# Patient Record
Sex: FEMALE | Race: WHITE | NOT HISPANIC OR LATINO | Employment: OTHER | URBAN - METROPOLITAN AREA
[De-identification: names, ages, dates, MRNs, and addresses within clinical notes are randomized per-mention and may not be internally consistent; named-entity substitution may affect disease eponyms.]

---

## 2018-10-30 ENCOUNTER — HOSPITAL ENCOUNTER (EMERGENCY)
Facility: HOSPITAL | Age: 83
Discharge: HOME | End: 2018-10-30
Attending: EMERGENCY MEDICINE | Admitting: EMERGENCY MEDICINE
Payer: MEDICARE

## 2018-10-30 ENCOUNTER — APPOINTMENT (EMERGENCY)
Dept: RADIOLOGY | Facility: HOSPITAL | Age: 83
End: 2018-10-30
Payer: MEDICARE

## 2018-10-30 ENCOUNTER — APPOINTMENT (EMERGENCY)
Dept: CT IMAGING | Facility: HOSPITAL | Age: 83
End: 2018-10-30
Payer: MEDICARE

## 2018-10-30 VITALS
OXYGEN SATURATION: 96 % | HEART RATE: 70 BPM | WEIGHT: 87 LBS | SYSTOLIC BLOOD PRESSURE: 191 MMHG | TEMPERATURE: 97.7 F | RESPIRATION RATE: 18 BRPM | DIASTOLIC BLOOD PRESSURE: 93 MMHG

## 2018-10-30 DIAGNOSIS — S02.2XXA CLOSED FRACTURE OF NASAL BONE, INITIAL ENCOUNTER: Primary | ICD-10-CM

## 2018-10-30 PROCEDURE — 73564 X-RAY EXAM KNEE 4 OR MORE: CPT

## 2018-10-30 PROCEDURE — 70450 CT HEAD/BRAIN W/O DYE: CPT

## 2018-10-30 PROCEDURE — 72125 CT NECK SPINE W/O DYE: CPT

## 2018-10-30 PROCEDURE — 99284 EMERGENCY DEPT VISIT MOD MDM: CPT

## 2018-10-30 PROCEDURE — 70486 CT MAXILLOFACIAL W/O DYE: CPT

## 2018-10-30 RX ORDER — GINSENG 100 MG
2 CAPSULE ORAL ONCE
Status: DISCONTINUED | OUTPATIENT
Start: 2018-10-30 | End: 2018-10-30 | Stop reason: HOSPADM

## 2018-10-30 RX ORDER — GINSENG 100 MG
1 CAPSULE ORAL ONCE
Status: COMPLETED | OUTPATIENT
Start: 2018-10-30 | End: 2018-10-30

## 2018-10-30 RX ADMIN — BACITRACIN 1 SMALL APPLICATION: 500 OINTMENT TOPICAL at 17:44

## 2018-10-30 NOTE — DISCHARGE INSTRUCTIONS
Facial Fracture   WHAT YOU NEED TO KNOW:   A facial fracture is a break in one or more of the bones in your face  The bones in your face include those around your eye, your cheekbones, and the bones of your nose and jaw  A facial fracture may also cause damage to nearby tissue  DISCHARGE INSTRUCTIONS:   Medicines:   · Decongestant medicine:  Decongestants help decrease swelling in your nose and sinuses  This medicine may also help you breathe easier  · Pain medicine: You may be given a prescription medicine to decrease pain  Do not wait until the pain is severe before you take this medicine  · Steroid medicine: This medicine helps decrease swelling in your face  · Antibiotic medicine:  Antibiotic medicine helps treat an infection caused by bacteria  This medicine may be given if you have an open wound  · Take your medicine as directed  Contact your healthcare provider if you think your medicine is not helping or if you have side effects  Tell him of her if you are allergic to any medicine  Keep a list of the medicines, vitamins, and herbs you take  Include the amounts, and when and why you take them  Bring the list or the pill bottles to follow-up visits  Carry your medicine list with you in case of an emergency  Follow up with your healthcare provider as directed:  Write down your questions so you remember to ask them during your visits  Nutrition:  You may not be able to eat solid food for a period of time  You may first be started on a liquid diet  Examples of liquids you may be able to have include, water, broth, gelatin, apple juice, or lemon-lime soda pop  After a few days, you may be allowed to eat soft foods, such as applesauce, bananas, cooked cereal, cottage cheese, pudding, and yogurt  Ask for more information about the type of foods you can eat  Rehabilitation:  If you had surgery to fix your facial fracture, you may need oral and facial rehabilitation   This is done to restore normal use and movement of your facial muscles  Ask for more information about rehabilitation  Help prevent a facial fracture:   · Wear a helmet when you ride a bicycle or a motorcycle  · Wear a seatbelt at all times when you are inside a motor vehicle  · Wear protective headgear and eyewear during sporting activities  Self-care:   · Apply ice:  Ice helps decrease swelling and pain  Ice may also help prevent tissue damage  Use an ice pack or put crushed ice in a plastic bag  Cover it with a towel and place it on your face for 15 to 20 minutes every hour as directed  · Keep your head elevated:  Keep you head above the level of your heart as often as you can  This will help decrease swelling and pain  Prop your head on pillows or blankets to keep it elevated comfortably  · Avoid putting pressure on your face:      ¨ Do not sleep on the injured side of your face  Pressure on the area of your injury may cause further damage  ¨ Sneeze with your mouth open to decrease pressure on your broken facial bones  Too much pressure from a sneeze may cause your broken bones to move and cause more damage  ¨ Try not to blow your nose because it may cause more damage if you have a fracture near your eye  The pressure from blowing your nose may pinch the nerve of your eye and cause permanent damage  · Clean your mouth carefully: It may be hard to clean your teeth if have an injury or fracture near your mouth  Your will be shown the best way to do this so you do not hurt yourself  A water pick or a child-sized soft toothbrush may work well to clean your mouth  Contact your healthcare provider if:   · You are bleeding from a wound on your face  · You have double vision or you suddenly have problems with your eyesight  · You have questions or concerns about your condition or care  Return to the emergency department if:   · You have clear or pinkish fluid draining from your nose or mouth      · You have numbness in your face  · You have worsening pain in your eye or face  · You suddenly have trouble chewing or swallowing  · You suddenly feel lightheaded and short of breath  · You have chest pain when you take a deep breath or cough  You may cough up blood  · Your arm or leg feels warm, tender, and painful  It may look swollen and red  © 2017 [address] [employer] Information is for End User's use only and may not be sold, redistributed or otherwise used for commercial purposes  All illustrations and images included in CareNotes® are the copyrighted property of [employer] , [employer]  or Allen Rodriges  The above information is an  only  It is not intended as medical advice for individual conditions or treatments  Talk to your doctor, nurse or pharmacist before following any medical regimen to see if it is safe and effective for you

## 2018-10-30 NOTE — ED PROVIDER NOTES
History  Chief Complaint   Patient presents with   Dilma Pall Fall     witnessed tripped and fell  hit face and knee on pavement  no LOC and no bloodthinners  49-year-old female presents after fall  She tripped over a curb just prior to arrival   She fell on her right knee and hit her face  Denies loss of consciousness  Not on any blood thinners complaining of facial pain over her right eye and right knee pain  She was able to ambulate after the fall  Denies any lightheadedness or dizziness, chest pain or palpitations before the fall  None       Past Medical History:   Diagnosis Date    Hypertension        History reviewed  No pertinent surgical history  History reviewed  No pertinent family history  I have reviewed and agree with the history as documented  Social History   Substance Use Topics    Smoking status: Never Smoker    Smokeless tobacco: Never Used    Alcohol use Yes      Comment: social        Review of Systems   Constitutional: Negative for chills and fever  HENT: Negative for dental problem and ear pain          +facial pain   Eyes: Negative for pain and redness  Respiratory: Negative for cough and shortness of breath  Cardiovascular: Negative for chest pain and palpitations  Gastrointestinal: Negative for abdominal pain and nausea  Endocrine: Negative for polydipsia and polyphagia  Genitourinary: Negative for dysuria and frequency  Musculoskeletal: Positive for arthralgias  Negative for joint swelling  Skin: Negative for color change and rash  Neurological: Negative for dizziness and headaches  Psychiatric/Behavioral: Negative for behavioral problems and confusion  All other systems reviewed and are negative  Physical Exam  Physical Exam   Constitutional: She is oriented to person, place, and time  She appears well-developed and well-nourished  No distress     HENT:   Right Ear: External ear normal    Left Ear: External ear normal    Nose: Nose normal  Bruising over R side of face worse around right eye with mild TTP   Eyes: Pupils are equal, round, and reactive to light  Conjunctivae and EOM are normal    Neck: Normal range of motion  Neck supple  No JVD present  Cardiovascular: Normal rate, regular rhythm and normal heart sounds  No murmur heard  Pulmonary/Chest: Effort normal and breath sounds normal  No respiratory distress  She has no wheezes  Abdominal: Soft  Bowel sounds are normal  She exhibits no distension  There is no tenderness  Musculoskeletal: Normal range of motion  She exhibits no edema  Skin tear to right knee, full ROM of knee   Neurological: She is alert and oriented to person, place, and time  No cranial nerve deficit  Skin: Skin is warm and dry  Capillary refill takes less than 2 seconds  She is not diaphoretic  Psychiatric: She has a normal mood and affect  Her behavior is normal    Nursing note and vitals reviewed  Vital Signs  ED Triage Vitals [10/30/18 1455]   Temperature Pulse Respirations Blood Pressure SpO2   97 7 °F (36 5 °C) 70 18 (!) 191/93 96 %      Temp Source Heart Rate Source Patient Position - Orthostatic VS BP Location FiO2 (%)   Oral -- -- -- --      Pain Score       --           Vitals:    10/30/18 1455   BP: (!) 191/93   Pulse: 70       Visual Acuity      ED Medications  Medications   bacitracin topical ointment 2 large application (2 large application Topical Not Given 10/30/18 1921)   bacitracin topical ointment 1 small application (1 small application Topical Given 10/30/18 1744)       Diagnostic Studies  Results Reviewed     None                 CT facial bones without contrast   Final Result by Xiang Holder MD (10/30 1826)      Acute left nasal bone slightly displaced fracture  Right infraorbital and supraorbital soft tissue swelling without underlying fracture                 Workstation performed: [id number]         CT spine cervical without contrast   Final Result by Xiang Holder MD (10/30 1823)      No cervical spine fracture or traumatic malalignment  Workstation performed: [id number]         CT head without contrast   Final Result by Pritesh Hamm MD (10/30 1820)      No acute intracranial abnormality  Right infraorbital and supraorbital soft tissue swelling without underlying fracture  Workstation performed: [id number]         XR knee 4+ vw right injury    (Results Pending)              Procedures  Procedures       Phone Contacts  ED Phone Contact    ED Course                               MDM  Number of Diagnoses or Management Options  Closed fracture of nasal bone, initial encounter:   Diagnosis management comments: 61-year-old female presenting after mechanical fall  CT maxillofacial shows left nasal bone fracture  Negative CT head otherwise  Knee pain after fall x-rays negative on my read  Follow up primary care doctor and maxillofacial surgery  CritCare Time    Disposition  Final diagnoses:   Closed fracture of nasal bone, initial encounter     Time reflects when diagnosis was documented in both MDM as applicable and the Disposition within this note     Time User Action Codes Description Comment    10/30/2018  6:44 PM Rosalinda Habermann Add [S02  2XXA] Closed fracture of nasal bone, initial encounter       ED Disposition     ED Disposition Condition Comment    Discharge  Alvaro Joseph discharge to home/self care  Condition at discharge: Good        Follow-up Information     Follow up With Specialties Details Why [address] [facility]  Schedule an appointment as soon as possible for a visit  [facility] [address]  [phone]          There are no discharge medications for this patient  No discharge procedures on file      ED Provider  Electronically Signed by           Farzana Myles MD  10/30/18 2030

## 2023-08-06 ENCOUNTER — APPOINTMENT (EMERGENCY)
Dept: RADIOLOGY | Facility: HOSPITAL | Age: 88
DRG: 481 | End: 2023-08-06
Payer: MEDICARE

## 2023-08-06 ENCOUNTER — HOSPITAL ENCOUNTER (INPATIENT)
Facility: HOSPITAL | Age: 88
LOS: 3 days | Discharge: NURSING, REHAB OR LONG-TERM CARE | DRG: 481 | End: 2023-08-09
Attending: SURGERY | Admitting: SURGERY
Payer: MEDICARE

## 2023-08-06 ENCOUNTER — APPOINTMENT (EMERGENCY)
Dept: CT IMAGING | Facility: HOSPITAL | Age: 88
DRG: 481 | End: 2023-08-06
Payer: MEDICARE

## 2023-08-06 DIAGNOSIS — S72.145A CLOSED NONDISPLACED INTERTROCHANTERIC FRACTURE OF LEFT FEMUR, INITIAL ENCOUNTER (HCC): ICD-10-CM

## 2023-08-06 DIAGNOSIS — S72.90XA FEMUR FRACTURE (HCC): Primary | ICD-10-CM

## 2023-08-06 PROBLEM — G89.11 ACUTE PAIN DUE TO TRAUMA: Status: ACTIVE | Noted: 2023-08-06
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
## 2023-08-06 PROBLEM — S72.92XA FRACTURE OF LEFT FEMUR (HCC): Status: ACTIVE | Noted: 2023-08-06

## 2023-08-06 PROBLEM — I26.99 PULMONARY EMBOLISM (HCC): Status: ACTIVE | Noted: 2023-08-06

## 2023-08-06 PROBLEM — I10 HYPERTENSION: Status: ACTIVE | Noted: 2023-08-06

## 2023-08-06 LAB
ABO GROUP BLD: NORMAL
ANION GAP SERPL CALCULATED.3IONS-SCNC: 6 MMOL/L
ANION GAP SERPL CALCULATED.3IONS-SCNC: 6 MMOL/L
APTT PPP: 27 SECONDS (ref 23–37)
APTT PPP: 27 SECONDS (ref 23–37)
BASE EXCESS BLDA CALC-SCNC: 10 MMOL/L (ref -2–3)
BASE EXCESS BLDA CALC-SCNC: 10 MMOL/L (ref -2–3)
BASOPHILS # BLD AUTO: 0.03 THOUSANDS/ÂΜL (ref 0–0.1)
BASOPHILS # BLD AUTO: 0.03 THOUSANDS/ÂΜL (ref 0–0.1)
BASOPHILS NFR BLD AUTO: 0 % (ref 0–1)
BASOPHILS NFR BLD AUTO: 0 % (ref 0–1)
BLD GP AB SCN SERPL QL: NEGATIVE
BLD GP AB SCN SERPL QL: NEGATIVE
BUN SERPL-MCNC: 24 MG/DL (ref 5–25)
BUN SERPL-MCNC: 24 MG/DL (ref 5–25)
CA-I BLD-SCNC: 1.15 MMOL/L (ref 1.12–1.32)
CA-I BLD-SCNC: 1.15 MMOL/L (ref 1.12–1.32)
CALCIUM SERPL-MCNC: 8.9 MG/DL (ref 8.4–10.2)
CALCIUM SERPL-MCNC: 8.9 MG/DL (ref 8.4–10.2)
CHLORIDE SERPL-SCNC: 102 MMOL/L (ref 96–108)
CHLORIDE SERPL-SCNC: 102 MMOL/L (ref 96–108)
CO2 SERPL-SCNC: 34 MMOL/L (ref 21–32)
CO2 SERPL-SCNC: 34 MMOL/L (ref 21–32)
CREAT SERPL-MCNC: 0.91 MG/DL (ref 0.6–1.3)
CREAT SERPL-MCNC: 0.91 MG/DL (ref 0.6–1.3)
EOSINOPHIL # BLD AUTO: 0.03 THOUSAND/ÂΜL (ref 0–0.61)
EOSINOPHIL # BLD AUTO: 0.03 THOUSAND/ÂΜL (ref 0–0.61)
EOSINOPHIL NFR BLD AUTO: 0 % (ref 0–6)
EOSINOPHIL NFR BLD AUTO: 0 % (ref 0–6)
ERYTHROCYTE [DISTWIDTH] IN BLOOD BY AUTOMATED COUNT: 19.1 % (ref 11.6–15.1)
ERYTHROCYTE [DISTWIDTH] IN BLOOD BY AUTOMATED COUNT: 19.1 % (ref 11.6–15.1)
GFR SERPL CREATININE-BSD FRML MDRD: 50 ML/MIN/1.73SQ M
GFR SERPL CREATININE-BSD FRML MDRD: 50 ML/MIN/1.73SQ M
GLUCOSE SERPL-MCNC: 101 MG/DL (ref 65–140)
GLUCOSE SERPL-MCNC: 101 MG/DL (ref 65–140)
GLUCOSE SERPL-MCNC: 175 MG/DL (ref 65–140)
GLUCOSE SERPL-MCNC: 175 MG/DL (ref 65–140)
HCO3 BLDA-SCNC: 35.5 MMOL/L (ref 24–30)
HCO3 BLDA-SCNC: 35.5 MMOL/L (ref 24–30)
HCT VFR BLD AUTO: 34.5 % (ref 34.8–46.1)
HCT VFR BLD AUTO: 34.5 % (ref 34.8–46.1)
HCT VFR BLD CALC: 34 % (ref 34.8–46.1)
HCT VFR BLD CALC: 34 % (ref 34.8–46.1)
HGB BLD-MCNC: 10.3 G/DL (ref 11.5–15.4)
HGB BLD-MCNC: 10.3 G/DL (ref 11.5–15.4)
HGB BLDA-MCNC: 11.6 G/DL (ref 11.5–15.4)
HGB BLDA-MCNC: 11.6 G/DL (ref 11.5–15.4)
HOLD SPECIMEN: NORMAL
IMM GRANULOCYTES # BLD AUTO: 0.05 THOUSAND/UL (ref 0–0.2)
IMM GRANULOCYTES # BLD AUTO: 0.05 THOUSAND/UL (ref 0–0.2)
IMM GRANULOCYTES NFR BLD AUTO: 0 % (ref 0–2)
IMM GRANULOCYTES NFR BLD AUTO: 0 % (ref 0–2)
INR PPP: 1.11 (ref 0.84–1.19)
INR PPP: 1.11 (ref 0.84–1.19)
LYMPHOCYTES # BLD AUTO: 1.17 THOUSANDS/ÂΜL (ref 0.6–4.47)
LYMPHOCYTES # BLD AUTO: 1.17 THOUSANDS/ÂΜL (ref 0.6–4.47)
LYMPHOCYTES NFR BLD AUTO: 10 % (ref 14–44)
LYMPHOCYTES NFR BLD AUTO: 10 % (ref 14–44)
MAGNESIUM SERPL-MCNC: 2.1 MG/DL (ref 1.9–2.7)
MAGNESIUM SERPL-MCNC: 2.1 MG/DL (ref 1.9–2.7)
MCH RBC QN AUTO: 29.3 PG (ref 26.8–34.3)
MCH RBC QN AUTO: 29.3 PG (ref 26.8–34.3)
MCHC RBC AUTO-ENTMCNC: 29.9 G/DL (ref 31.4–37.4)
MCHC RBC AUTO-ENTMCNC: 29.9 G/DL (ref 31.4–37.4)
MCV RBC AUTO: 98 FL (ref 82–98)
MCV RBC AUTO: 98 FL (ref 82–98)
MONOCYTES # BLD AUTO: 0.51 THOUSAND/ÂΜL (ref 0.17–1.22)
MONOCYTES # BLD AUTO: 0.51 THOUSAND/ÂΜL (ref 0.17–1.22)
MONOCYTES NFR BLD AUTO: 4 % (ref 4–12)
MONOCYTES NFR BLD AUTO: 4 % (ref 4–12)
NEUTROPHILS # BLD AUTO: 10.41 THOUSANDS/ÂΜL (ref 1.85–7.62)
NEUTROPHILS # BLD AUTO: 10.41 THOUSANDS/ÂΜL (ref 1.85–7.62)
NEUTS SEG NFR BLD AUTO: 86 % (ref 43–75)
NEUTS SEG NFR BLD AUTO: 86 % (ref 43–75)
NRBC BLD AUTO-RTO: 0 /100 WBCS
NRBC BLD AUTO-RTO: 0 /100 WBCS
PCO2 BLD: 37 MMOL/L (ref 21–32)
PCO2 BLD: 37 MMOL/L (ref 21–32)
PCO2 BLD: 50.8 MM HG (ref 42–50)
PCO2 BLD: 50.8 MM HG (ref 42–50)
PH BLD: 7.45 [PH] (ref 7.3–7.4)
PH BLD: 7.45 [PH] (ref 7.3–7.4)
PHOSPHATE SERPL-MCNC: 3.5 MG/DL (ref 2.3–4.1)
PHOSPHATE SERPL-MCNC: 3.5 MG/DL (ref 2.3–4.1)
PLATELET # BLD AUTO: 297 THOUSANDS/UL (ref 149–390)
PLATELET # BLD AUTO: 297 THOUSANDS/UL (ref 149–390)
PLATELET # BLD AUTO: 304 THOUSANDS/UL (ref 149–390)
PLATELET # BLD AUTO: 304 THOUSANDS/UL (ref 149–390)
PMV BLD AUTO: 10.8 FL (ref 8.9–12.7)
PMV BLD AUTO: 10.8 FL (ref 8.9–12.7)
PMV BLD AUTO: 11.9 FL (ref 8.9–12.7)
PMV BLD AUTO: 11.9 FL (ref 8.9–12.7)
PO2 BLD: 35 MM HG (ref 35–45)
PO2 BLD: 35 MM HG (ref 35–45)
POTASSIUM BLD-SCNC: 4.4 MMOL/L (ref 3.5–5.3)
POTASSIUM BLD-SCNC: 4.4 MMOL/L (ref 3.5–5.3)
POTASSIUM SERPL-SCNC: 4 MMOL/L (ref 3.5–5.3)
POTASSIUM SERPL-SCNC: 4 MMOL/L (ref 3.5–5.3)
PROTHROMBIN TIME: 14.6 SECONDS (ref 11.6–14.5)
PROTHROMBIN TIME: 14.6 SECONDS (ref 11.6–14.5)
RBC # BLD AUTO: 3.52 MILLION/UL (ref 3.81–5.12)
RBC # BLD AUTO: 3.52 MILLION/UL (ref 3.81–5.12)
RH BLD: POSITIVE
SAO2 % BLD FROM PO2: 68 % (ref 60–85)
SAO2 % BLD FROM PO2: 68 % (ref 60–85)
SODIUM BLD-SCNC: 142 MMOL/L (ref 136–145)
SODIUM BLD-SCNC: 142 MMOL/L (ref 136–145)
SODIUM SERPL-SCNC: 142 MMOL/L (ref 135–147)
SODIUM SERPL-SCNC: 142 MMOL/L (ref 135–147)
SPECIMEN EXPIRATION DATE: NORMAL
SPECIMEN EXPIRATION DATE: NORMAL
SPECIMEN SOURCE: ABNORMAL
SPECIMEN SOURCE: ABNORMAL
WBC # BLD AUTO: 12.2 THOUSAND/UL (ref 4.31–10.16)
WBC # BLD AUTO: 12.2 THOUSAND/UL (ref 4.31–10.16)

## 2023-08-06 PROCEDURE — 93005 ELECTROCARDIOGRAM TRACING: CPT

## 2023-08-06 PROCEDURE — 86850 RBC ANTIBODY SCREEN: CPT | Performed by: SURGERY

## 2023-08-06 PROCEDURE — 84132 ASSAY OF SERUM POTASSIUM: CPT

## 2023-08-06 PROCEDURE — 85025 COMPLETE CBC W/AUTO DIFF WBC: CPT | Performed by: PHYSICIAN ASSISTANT

## 2023-08-06 PROCEDURE — 82947 ASSAY GLUCOSE BLOOD QUANT: CPT

## 2023-08-06 PROCEDURE — 30233N1 TRANSFUSION OF NONAUTOLOGOUS RED BLOOD CELLS INTO PERIPHERAL VEIN, PERCUTANEOUS APPROACH: ICD-10-PCS | Performed by: SURGERY

## 2023-08-06 PROCEDURE — EDAIR PR ED AIR: Performed by: EMERGENCY MEDICINE

## 2023-08-06 PROCEDURE — 82803 BLOOD GASES ANY COMBINATION: CPT

## 2023-08-06 PROCEDURE — 99223 1ST HOSP IP/OBS HIGH 75: CPT | Performed by: SURGERY

## 2023-08-06 PROCEDURE — 70450 CT HEAD/BRAIN W/O DYE: CPT

## 2023-08-06 PROCEDURE — 85014 HEMATOCRIT: CPT

## 2023-08-06 PROCEDURE — 99223 1ST HOSP IP/OBS HIGH 75: CPT | Performed by: STUDENT IN AN ORGANIZED HEALTH CARE EDUCATION/TRAINING PROGRAM

## 2023-08-06 PROCEDURE — 85049 AUTOMATED PLATELET COUNT: CPT

## 2023-08-06 PROCEDURE — 93308 TTE F-UP OR LMTD: CPT | Performed by: PHYSICIAN ASSISTANT

## 2023-08-06 PROCEDURE — 84295 ASSAY OF SERUM SODIUM: CPT

## 2023-08-06 PROCEDURE — 86920 COMPATIBILITY TEST SPIN: CPT

## 2023-08-06 PROCEDURE — 76705 ECHO EXAM OF ABDOMEN: CPT | Performed by: PHYSICIAN ASSISTANT

## 2023-08-06 PROCEDURE — 85610 PROTHROMBIN TIME: CPT | Performed by: PHYSICIAN ASSISTANT

## 2023-08-06 PROCEDURE — 85730 THROMBOPLASTIN TIME PARTIAL: CPT | Performed by: PHYSICIAN ASSISTANT

## 2023-08-06 PROCEDURE — 82330 ASSAY OF CALCIUM: CPT

## 2023-08-06 PROCEDURE — 72125 CT NECK SPINE W/O DYE: CPT

## 2023-08-06 PROCEDURE — 99285 EMERGENCY DEPT VISIT HI MDM: CPT

## 2023-08-06 PROCEDURE — 86900 BLOOD TYPING SEROLOGIC ABO: CPT | Performed by: SURGERY

## 2023-08-06 PROCEDURE — 84100 ASSAY OF PHOSPHORUS: CPT | Performed by: PHYSICIAN ASSISTANT

## 2023-08-06 PROCEDURE — 86901 BLOOD TYPING SEROLOGIC RH(D): CPT | Performed by: SURGERY

## 2023-08-06 PROCEDURE — 36415 COLL VENOUS BLD VENIPUNCTURE: CPT | Performed by: PHYSICIAN ASSISTANT

## 2023-08-06 PROCEDURE — 73552 X-RAY EXAM OF FEMUR 2/>: CPT

## 2023-08-06 PROCEDURE — 80048 BASIC METABOLIC PNL TOTAL CA: CPT | Performed by: PHYSICIAN ASSISTANT

## 2023-08-06 PROCEDURE — 83735 ASSAY OF MAGNESIUM: CPT | Performed by: PHYSICIAN ASSISTANT

## 2023-08-06 RX ORDER — MELATONIN
2000 DAILY
Status: DISCONTINUED | OUTPATIENT
Start: 2023-08-07 | End: 2023-08-09 | Stop reason: HOSPADM

## 2023-08-06 RX ORDER — ASPIRIN 81 MG/1
81 TABLET, CHEWABLE ORAL DAILY
COMMUNITY
End: 2023-08-14

## 2023-08-06 RX ORDER — FOLIC ACID 1 MG/1
1 TABLET ORAL DAILY
COMMUNITY
End: 2023-08-14

## 2023-08-06 RX ORDER — ONDANSETRON 2 MG/ML
4 INJECTION INTRAMUSCULAR; INTRAVENOUS EVERY 6 HOURS PRN
Status: DISCONTINUED | OUTPATIENT
Start: 2023-08-06 | End: 2023-08-06

## 2023-08-06 RX ORDER — HEPARIN SODIUM 10000 [USP'U]/100ML
3-30 INJECTION, SOLUTION INTRAVENOUS
Status: DISCONTINUED | OUTPATIENT
Start: 2023-08-06 | End: 2023-08-07

## 2023-08-06 RX ORDER — ACETAMINOPHEN 325 MG/1
975 TABLET ORAL EVERY 8 HOURS SCHEDULED
Status: DISCONTINUED | OUTPATIENT
Start: 2023-08-06 | End: 2023-08-09 | Stop reason: HOSPADM

## 2023-08-06 RX ORDER — AMLODIPINE BESYLATE 10 MG/1
5 TABLET ORAL DAILY
COMMUNITY
End: 2023-08-14

## 2023-08-06 RX ORDER — ISOSORBIDE MONONITRATE 30 MG/1
30 TABLET, EXTENDED RELEASE ORAL DAILY
COMMUNITY

## 2023-08-06 RX ORDER — ONDANSETRON 2 MG/ML
4 INJECTION INTRAMUSCULAR; INTRAVENOUS EVERY 4 HOURS PRN
Status: DISCONTINUED | OUTPATIENT
Start: 2023-08-06 | End: 2023-08-09 | Stop reason: HOSPADM

## 2023-08-06 RX ORDER — HEPARIN SODIUM 1000 [USP'U]/ML
1600 INJECTION, SOLUTION INTRAVENOUS; SUBCUTANEOUS EVERY 6 HOURS PRN
Status: DISCONTINUED | OUTPATIENT
Start: 2023-08-06 | End: 2023-08-07

## 2023-08-06 RX ORDER — OXYCODONE HYDROCHLORIDE 5 MG/1
5 TABLET ORAL EVERY 4 HOURS PRN
Status: DISCONTINUED | OUTPATIENT
Start: 2023-08-06 | End: 2023-08-09 | Stop reason: HOSPADM

## 2023-08-06 RX ORDER — HEPARIN SODIUM 1000 [USP'U]/ML
3200 INJECTION, SOLUTION INTRAVENOUS; SUBCUTANEOUS EVERY 6 HOURS PRN
Status: DISCONTINUED | OUTPATIENT
Start: 2023-08-06 | End: 2023-08-07

## 2023-08-06 RX ORDER — ENOXAPARIN SODIUM 100 MG/ML
30 INJECTION SUBCUTANEOUS
Status: DISCONTINUED | OUTPATIENT
Start: 2023-08-06 | End: 2023-08-06

## 2023-08-06 RX ORDER — MELATONIN
2000 DAILY
COMMUNITY
End: 2023-08-14

## 2023-08-06 RX ORDER — LISINOPRIL 10 MG/1
10 TABLET ORAL 2 TIMES DAILY
COMMUNITY
End: 2023-08-14

## 2023-08-06 RX ORDER — HYDROMORPHONE HCL IN WATER/PF 6 MG/30 ML
0.2 PATIENT CONTROLLED ANALGESIA SYRINGE INTRAVENOUS EVERY 4 HOURS PRN
Status: DISCONTINUED | OUTPATIENT
Start: 2023-08-06 | End: 2023-08-09 | Stop reason: HOSPADM

## 2023-08-06 RX ORDER — ISOSORBIDE MONONITRATE 30 MG/1
30 TABLET, EXTENDED RELEASE ORAL DAILY
Status: DISCONTINUED | OUTPATIENT
Start: 2023-08-07 | End: 2023-08-09 | Stop reason: HOSPADM

## 2023-08-06 RX ORDER — METOPROLOL SUCCINATE 25 MG/1
25 TABLET, EXTENDED RELEASE ORAL
COMMUNITY
End: 2023-08-14

## 2023-08-06 RX ORDER — AMLODIPINE BESYLATE 5 MG/1
5 TABLET ORAL DAILY
Status: DISCONTINUED | OUTPATIENT
Start: 2023-08-07 | End: 2023-08-09 | Stop reason: HOSPADM

## 2023-08-06 RX ORDER — GABAPENTIN 100 MG/1
100 CAPSULE ORAL
Status: DISCONTINUED | OUTPATIENT
Start: 2023-08-06 | End: 2023-08-09 | Stop reason: HOSPADM

## 2023-08-06 RX ORDER — FOLIC ACID 1 MG/1
1 TABLET ORAL DAILY
Status: DISCONTINUED | OUTPATIENT
Start: 2023-08-07 | End: 2023-08-09 | Stop reason: HOSPADM

## 2023-08-06 RX ORDER — SODIUM CHLORIDE, SODIUM GLUCONATE, SODIUM ACETATE, POTASSIUM CHLORIDE, MAGNESIUM CHLORIDE, SODIUM PHOSPHATE, DIBASIC, AND POTASSIUM PHOSPHATE .53; .5; .37; .037; .03; .012; .00082 G/100ML; G/100ML; G/100ML; G/100ML; G/100ML; G/100ML; G/100ML
50 INJECTION, SOLUTION INTRAVENOUS CONTINUOUS
Status: DISCONTINUED | OUTPATIENT
Start: 2023-08-06 | End: 2023-08-06

## 2023-08-06 RX ORDER — POLYETHYLENE GLYCOL 3350 17 G/17G
17 POWDER, FOR SOLUTION ORAL DAILY
Status: DISCONTINUED | OUTPATIENT
Start: 2023-08-06 | End: 2023-08-09 | Stop reason: HOSPADM

## 2023-08-06 RX ORDER — METOPROLOL SUCCINATE 25 MG/1
25 TABLET, EXTENDED RELEASE ORAL
Status: DISCONTINUED | OUTPATIENT
Start: 2023-08-06 | End: 2023-08-09 | Stop reason: HOSPADM

## 2023-08-06 RX ADMIN — TRANEXAMIC ACID 430 MG: 100 INJECTION, SOLUTION INTRAVENOUS at 16:23

## 2023-08-06 RX ADMIN — HEPARIN SODIUM 18 UNITS/KG/HR: 10000 INJECTION, SOLUTION INTRAVENOUS at 18:09

## 2023-08-06 RX ADMIN — GABAPENTIN 100 MG: 100 CAPSULE ORAL at 21:07

## 2023-08-06 RX ADMIN — ACETAMINOPHEN 975 MG: 325 TABLET, FILM COATED ORAL at 15:40

## 2023-08-06 RX ADMIN — ACETAMINOPHEN 975 MG: 325 TABLET, FILM COATED ORAL at 21:07

## 2023-08-06 RX ADMIN — OXYCODONE HYDROCHLORIDE 5 MG: 5 TABLET ORAL at 17:37

## 2023-08-06 RX ADMIN — METOPROLOL SUCCINATE 25 MG: 25 TABLET, EXTENDED RELEASE ORAL at 21:06

## 2023-08-06 RX ADMIN — SODIUM CHLORIDE, SODIUM GLUCONATE, SODIUM ACETATE, POTASSIUM CHLORIDE, MAGNESIUM CHLORIDE, SODIUM PHOSPHATE, DIBASIC, AND POTASSIUM PHOSPHATE 50 ML/HR: .53; .5; .37; .037; .03; .012; .00082 INJECTION, SOLUTION INTRAVENOUS at 11:54

## 2023-08-06 RX ADMIN — ENOXAPARIN SODIUM 30 MG: 30 INJECTION SUBCUTANEOUS at 17:37

## 2023-08-06 NOTE — PROCEDURES
POC FAST US    Date/Time: 8/6/2023 10:34 AM    Performed by: Deepa Hall PA-C  Authorized by: Deepa Hall PA-C    Patient location:  Trauma  Procedure details:     Exam Type:  Diagnostic    Indications: blunt abdominal trauma and blunt chest trauma      Assess for:  Intra-abdominal fluid and pericardial effusion    Technique: FAST      Views obtained:  Heart - Pericardial sac, LUQ - Splenorenal space, Suprapubic - Pouch of James and RUQ - Galvan's Pouch    Image quality: diagnostic      Image availability:  Images available in PACS and video obtained  FAST Findings:     RUQ (Hepatorenal) free fluid: absent      LUQ (Splenorenal) free fluid: absent      Suprapubic free fluid: absent      Cardiac wall motion: identified      Pericardial effusion: absent    Interpretation:     Impressions: negative

## 2023-08-06 NOTE — ASSESSMENT & PLAN NOTE
- history of HTN  - continue home Toprol XL and amlodipine. Will hold lisinopril in the perioperative setting.

## 2023-08-06 NOTE — ASSESSMENT & PLAN NOTE
- per patient's POA, she was diagnosed with acute PE within the last couple of months at [facility]. She has been on Eliquis and 4 L oxygen NC since that time. - she follows now with her PCP for this  - heparin infusion while off of Eliquis.  Will hold pre op.   - Hold Eliquis for hip surgery for now  - continue supplemental oxygen

## 2023-08-06 NOTE — ASSESSMENT & PLAN NOTE
- left femoral fracture, present on admission.  - Orthopedic surgery evaluation pending.   - TXA given  - Maintain non-weightbearing status on the left extremity.   - Monitor left lower extremity neurovascular exam.  - Continue multimodal analgesic regimen.  - Continue DVT prophylaxis. Hold Eliquis. - PT and OT evaluation and treatment as indicated. - Outpatient follow up with Orthopedic surgery for re-evaluation.

## 2023-08-06 NOTE — H&P
[facility]  H&P  Name: Pietro Valdez 80 y.o. female I MRN: [id number]  Unit/Bed#: S -01 I Date of Admission: 8/6/2023   Date of Service: 8/6/2023 I Hospital Day: 0      Assessment/Plan   Fall  Assessment & Plan  - mechanical fall   - sustained below stated injuries  - PT/OT evaluations  - Geriatrics evaluation pending  - CM for dispo planning    * Fracture of left femur ([address])  Assessment & Plan  - left femoral fracture, present on admission.  - Orthopedic surgery evaluation pending.   - TXA given  - Maintain non-weightbearing status on the left extremity.   - Monitor left lower extremity neurovascular exam.  - Continue multimodal analgesic regimen.  - Continue DVT prophylaxis. Hold Eliquis. - PT and OT evaluation and treatment as indicated. - Outpatient follow up with Orthopedic surgery for re-evaluation. Acute pain due to trauma  Assessment & Plan  - Acute pain secondary to traumatic injuries. - Appreciate APS evaluation and recommendations. - Bowel regimen as long as using opioids.  - Continue to monitor pain and adjust regimen as indicated. Hypertension  Assessment & Plan  - history of HTN  - continue home Toprol XL and amlodipine. Will hold lisinopril in the perioperative setting. Pulmonary embolism [facility])  Assessment & Plan  - per patient's POA, she was diagnosed with acute PE within the last couple of months at [facility]. She has been on Eliquis and 4 L oxygen NC since that time. - she follows now with her PCP for this  - heparin infusion while off of Eliquis. Will hold pre op.   - Hold Eliquis for hip surgery for now  - continue supplemental oxygen        Trauma Alert: Level B   Model of Arrival: Ambulance    Trauma Team: Attending Yogesh Patricio and AP Frutiger  Consultants:     Orthopedics: routine consult; Epic consult order placed; History of Present Illness     Chief Complaint: "My hip hurts"  Mechanism:Fall     HPI:    Pietro Valdez is a 80 y. o. female who presents s/p mechanical fall at home in her kitchen when she was trying to turn on the light. She tripped and fell, bumping her head on the trash can. She fell on her left hip and has left hip pain. She denies head pain or neck pain. 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 She lives alone and has a caregiver who was with her when she fell. No LOC. Review of Systems   Constitutional: Negative. HENT: Negative. Respiratory: Negative. Negative for chest tightness and shortness of breath. Cardiovascular: Negative. Negative for chest pain. Gastrointestinal: Negative. Negative for abdominal pain. Genitourinary: Negative. Musculoskeletal:        + left hip pain   Skin: Negative. Neurological: Negative. Negative for dizziness, weakness, light-headedness, numbness and headaches. Hematological: Negative. Psychiatric/Behavioral: Negative. 12-point, complete review of systems was reviewed and negative except as stated above. Historical Information     Past Medical History:   Diagnosis Date   • CAD (coronary artery disease)    • Hypertension    • Pulmonary embolism ([address])      History reviewed. No pertinent surgical history. Unable to obtain history due to Unreliable historian     Social History     Tobacco Use   • Smoking status: Never   • Smokeless tobacco: Never   Substance Use Topics   • Alcohol use: Yes     Alcohol/week: 2.0 standard drinks of alcohol     Types: 2 Glasses of wine per week   • Drug use: Never     Last Tetanus: unknown  Family History: Non-contributory    1. Before the illness or injury that brought you to the Emergency, did you need someone to help you on a regular basis? 1=Yes   2. Since the illness or injury that brought you to the Emergency, have you needed more help than usual to take care of yourself? 1=Yes   3. Have you been hospitalized for one or more nights during the past 6 months (excluding a stay in the Emergency Department)? 1=Yes   4.  In general, do you see well? 0=Yes   5. In general, do you have serious problems with your memory? 1=Yes   6. Do you take more than three different medications everyday? 1=Yes   TOTAL   6     Did you order a geriatric consult if the score was 2 or greater?: yes     Meds/Allergies   all current active meds have been reviewed and PTA meds:   Prior to Admission Medications   Prescriptions Last Dose Informant Patient Reported? Taking? amLODIPine (NORVASC) 10 mg tablet   Yes No   Sig: Take 5 mg by mouth daily   apixaban (Eliquis) 5 mg   Yes No   Sig: Take 5 mg by mouth 2 (two) times a day   aspirin 81 mg chewable tablet   Yes No   Sig: Chew 81 mg daily   cholecalciferol (VITAMIN D3) 1,000 units tablet   Yes No   Sig: Take 2,000 Units by mouth daily   folic acid (FOLVITE) 1 mg tablet   Yes No   Sig: Take 1 mg by mouth daily   isosorbide mononitrate (IMDUR) 30 mg 24 hr tablet   Yes No   Sig: Take 30 mg by mouth daily   lisinopril (ZESTRIL) 10 mg tablet   Yes No   Sig: Take 10 mg by mouth 2 (two) times a day   metoprolol succinate (TOPROL-XL) 25 mg 24 hr tablet   Yes No   Sig: Take 25 mg by mouth daily at bedtime      Facility-Administered Medications: None     Allergies have not been reviewed;   No Known Allergies    Objective   Initial Vitals:   Temperature: 97.9 °F (36.6 °C) (08/06/23 1020)  Pulse: 69 (08/06/23 1020)  Respirations: 16 (08/06/23 1020)  Blood Pressure: (!) 179/84 (08/06/23 1020)    Primary Survey:   Airway:        Status: patent;        Pre-hospital Interventions: none        Hospital Interventions: none  Breathing:        Pre-hospital Interventions: none       Effort: normal       Right breath sounds: normal       Left breath sounds: normal  Circulation:        Rhythm: regular       Rate: regular   Right Pulses Left Pulses    R radial: 2+  R femoral: 2+  R pedal: 2+  R carotid: 2+  R popliteal: 2+ L radial: 2+  L femoral: 2+  L pedal: 2+  L carotid: 2+  L popliteal: 2+   Disability:        GCS: Eye: 4; Verbal: 5 Motor: 6 Total: 15       Right Pupil: round;  reactive         Left Pupil:  round;  reactive      R Motor Strength L Motor Strength    R : 5/5  R dorsiflex: 5/5  R plantarflex: 5/5 L : 5/5  L dorsiflex: 5/5  L plantarflex: 5/5        Sensory:  No sensory deficit  Exposure:       Completed: Yes      Secondary Survey:  Physical Exam  HENT:      Head: Normocephalic. Nose: Nose normal.      Mouth/Throat:      Mouth: Mucous membranes are moist.   Eyes:      Pupils: Pupils are equal, round, and reactive to light. Cardiovascular:      Rate and Rhythm: Normal rate and regular rhythm. Pulses: Normal pulses. Pulmonary:      Effort: Pulmonary effort is normal. No respiratory distress. Breath sounds: Normal breath sounds. Abdominal:      General: Abdomen is flat. Palpations: Abdomen is soft. Tenderness: There is no abdominal tenderness. There is no guarding. Genitourinary:     General: Normal vulva. Musculoskeletal:      Cervical back: Normal range of motion and neck supple. No tenderness. Comments: + Left hip tenderness and unable to lift the leg    Skin:     General: Skin is warm and dry. Capillary Refill: Capillary refill takes less than 2 seconds. Neurological:      General: No focal deficit present. Mental Status: She is alert and oriented to person, place, and time. Sensory: No sensory deficit. Motor: No weakness. Psychiatric:         Behavior: Behavior normal.         Invasive Devices     Peripheral Intravenous Line  Duration           Peripheral IV 08/06/23 Left;Upper;Ventral (anterior) Arm <1 day              Lab Results: I have personally reviewed all pertinent laboratory/test results 08/06/23 and in the preceding 24 hours.   Recent Labs     08/06/23  1028 08/06/23  1029 08/06/23  1510   WBC  --   --  12.20*   HGB 11.6  --  10.3*   HCT 34*  --  34.5*   PLT  --    < > 297   SODIUM  --   --  142   K  --   --  4.0   CL  --   --  102   CO2 37*  --  34* BUN  --   --  24   CREATININE  --   --  0.91   GLUC  --   --  175*   CAIONIZED 1.15  --   --    MG  --   --  2.1   PHOS  --   --  3.5    < > = values in this interval not displayed. Imaging Results: I have personally reviewed pertinent images saved in PACS. CT scan findings (and other pertinent positive findings on images) were discussed with radiology. My interpretation of the images/reports are as follows:  XR Trauma multiple (SLB/SLRA trauma bay ONLY)    Result Date: 8/6/2023  Impression: Acute, mildly displaced intertrochanteric fracture of left femur. As per review of electronic medical record, trauma team aware of this finding at time of this dictation. Severely limited evaluation of the thorax due to patient rotation. No definite pneumothorax. No obvious displaced thoracic fractures within limitation of supine AP chest technique. Workstation performed: [id number]     TRAUMA - CT spine cervical wo contrast    Result Date: 8/6/2023  Impression: No cervical spine fracture or traumatic malalignment. Moderate spondylosis. Workstation performed: [id number]     TRAUMA - CT head wo contrast    Result Date: 8/6/2023  Impression: No intracranial hemorrhage or calvarial fracture. Moderate microangiopathy Workstation performed: [id number]     Other Studies: no new    Code Status: Level 1 - Full Code  Advance Directive and Living Will:      Power of :    POLST:    I have spent 40 minutes with Patient and family today in which greater than 50% of this time was spent in counseling/coordination of care regarding Diagnostic results, Prognosis, Impressions, Counseling / Coordination of care, Documenting in the medical record, Reviewing / ordering tests, medicine, procedures   and Obtaining or reviewing history  .

## 2023-08-06 NOTE — ASSESSMENT & PLAN NOTE
- Acute pain secondary to traumatic injuries. - Appreciate APS evaluation and recommendations. - Bowel regimen as long as using opioids.  - Continue to monitor pain and adjust regimen as indicated.

## 2023-08-06 NOTE — ED PROVIDER NOTES
Emergency Department Airway Evaluation and Management Form    History  Obtained from: EMS  Patient has no allergy information on record. No chief complaint on file. HPI    79 y/o fall, hit head, reported to be on anticoagulation    Airway intact. Rest of eval and treatment by trauma team    No past medical history on file. No past surgical history on file. No family history on file. I have reviewed and agree with the history as documented.     Review of Systems    Physical Exam  Resp 16     Physical Exam    ED Medications  Medications - No data to display    Intubation  Procedures    Notes      Final Diagnosis  Final diagnoses:   None       ED Provider  Electronically Signed by     Ginny Lacy MD  08/06/23 1900

## 2023-08-06 NOTE — CONSULTS
Orthopedics   [facility] 80 y.o. female MRN: [id number]  Unit/Bed#: S -01      Chief Complaint:   left hip pain    HPI:   80 y.o. female community ambulator status post fall at home complaining of left hip pain and inability to bear weight. Patient lives alone with her dog. She denies any significant medical issues. Denies chest pain or SOB, GI issues. Review Of Systems:   · Skin: Normal  · Neuro: See HPI  · Musculoskeletal: See HPI  · 14 point review of systems negative except as stated above     Past Medical History:   No past medical history on file. Past Surgical History:   No past surgical history on file. Family History:  Family history reviewed and non-contributory  No family history on file. Social History:  Social History     Socioeconomic History   • Marital status:       Spouse name: Not on file   • Number of children: Not on file   • Years of education: Not on file   • Highest education level: Not on file   Occupational History   • Not on file   Tobacco Use   • Smoking status: Not on file   • Smokeless tobacco: Not on file   Substance and Sexual Activity   • Alcohol use: Not on file   • Drug use: Not on file   • Sexual activity: Not on file   Other Topics Concern   • Not on file   Social History Narrative   • Not on file     Social Determinants of Health     Financial Resource Strain: Not on file   Food Insecurity: Not on file   Transportation Needs: Not on file   Physical Activity: Not on file   Stress: Not on file   Social Connections: Not on file   Intimate Partner Violence: Not on file   Housing Stability: Not on file       Allergies:   Not on File        Labs:  0   Lab Value Date/Time    HCT 34 (L) 08/06/2023 1028    HGB 11.6 08/06/2023 1028       Meds:    Current Facility-Administered Medications:   •  acetaminophen (TYLENOL) tablet 975 mg, 975 mg, Oral, Q8H [address], Ivy Wisdom PA-C  •  enoxaparin (LOVENOX) subcutaneous injection 30 mg, 30 mg, Subcutaneous, Q24H [address], Ivy Ha PA-C  •  gabapentin (NEURONTIN) capsule 100 mg, 100 mg, Oral, HS, Ivy Wisdom PA-C  •  HYDROmorphone HCl (DILAUDID) injection 0.2 mg, 0.2 mg, Intravenous, Q4H PRN, Topher Marie PA-C  •  multi-electrolyte (PLASMALYTE-A/ISOLYTE-S PH 7.4) IV solution, 50 mL/hr, Intravenous, Continuous, Ivy Wisdom PA-C, Last Rate: 50 mL/hr at 08/06/23 1154, 50 mL/hr at 08/06/23 1154  •  ondansetron (ZOFRAN) injection 4 mg, 4 mg, Intravenous, Q4H PRN, Topher Marie PA-C  •  oxyCODONE (ROXICODONE) IR tablet 5 mg, 5 mg, Oral, Q4H PRN, Topher Marie PA-C  •  oxyCODONE (ROXICODONE) split tablet 2.5 mg, 2.5 mg, Oral, Q4H PRN, Topher Marie PA-C  •  polyethylene glycol (MIRALAX) packet 17 g, 17 g, Oral, Daily, Ivy Wisdom PA-C  •  tranexamic Acid 430 mg in sodium chloride 0.9 % 100 mL IVPB, 10 mg/kg, Intravenous, Once, Topher Marie PA-C    Blood Culture:   No results found for: "BLOODCX"    Wound Culture:   No results found for: "WOUNDCULT"    Ins and Outs:  No intake/output data recorded. Physical Exam:   /93   Pulse 71   Temp 98.3 °F (36.8 °C)   Resp 16   Wt 43 kg (94 lb 12.8 oz)   SpO2 94%   Gen: No acute distress, resting comfortably in bed  HEENT: Eyes clear, moist mucus membranes, hearing intact  Respiratory: No audible wheezing or stridor  Cardiovascular: Well Perfused peripherally, 2+ distal pulse  Abdomen: nondistended, no peritoneal signs  Musculoskeletal: left lower extremity  · Skin intact, limb shortened and externally rotated  · Tender to palpation over hip  · ROM not assessed 2/2 known fracture  · Sensation intact L3-S1  · Intact ankle dorsi/plantar flexion, EHL/FHL  · 2+ DP/ PT pulse  · Musculature is soft and compressible, no pain with passive stretch    Radiology:   I personally reviewed the films.   X-rays AP/Lateral views of left hip shows Intertrochanteric femur fracture    _*_*_*_*_*_*_*_*_*_*_*_*_*_*_*_*_*_*_*_*_*_*_*_*_*_*_*_*_*_*_*_*_*_*_*_*_*_*_*_*_*    Assessment:  89 y. o.female status post mechanical fall with leftIntertrochanteric femur fracture    Plan:   · Non weight bearing left lower extremity  · Analgesics for pain  · NPO at midnight  · Sanchez Catheter insertion  · Medicine consult for all medical management and preoperative risk stratification  · Patient has history of PE within the last year and is on Eliquis  · Patient has history of HTN  · History of BCC  · To OR for IM nail femur fracture of Intertrochanteric femur fracture  · Dispo: Ortho will follow    Gay Tucker PA-C

## 2023-08-06 NOTE — ASSESSMENT & PLAN NOTE
- mechanical fall   - sustained below stated injuries  - PT/OT evaluations  - Geriatrics evaluation pending  - CM for dispo planning

## 2023-08-07 ENCOUNTER — APPOINTMENT (INPATIENT)
Dept: RADIOLOGY | Facility: HOSPITAL | Age: 88
DRG: 481 | End: 2023-08-07
Payer: MEDICARE

## 2023-08-07 ENCOUNTER — ANESTHESIA (INPATIENT)
Dept: PERIOP | Facility: HOSPITAL | Age: 88
DRG: 481 | End: 2023-08-07
Payer: MEDICARE

## 2023-08-07 ENCOUNTER — ANESTHESIA EVENT (INPATIENT)
Dept: PERIOP | Facility: HOSPITAL | Age: 88
DRG: 481 | End: 2023-08-07
Payer: MEDICARE

## 2023-08-07 PROBLEM — D62 ACUTE BLOOD LOSS ANEMIA: Status: ACTIVE | Noted: 2023-08-07

## 2023-08-07 LAB
ANION GAP SERPL CALCULATED.3IONS-SCNC: 4 MMOL/L
ANION GAP SERPL CALCULATED.3IONS-SCNC: 4 MMOL/L
APTT PPP: 31 SECONDS (ref 23–37)
APTT PPP: 31 SECONDS (ref 23–37)
APTT PPP: 71 SECONDS (ref 23–37)
APTT PPP: 71 SECONDS (ref 23–37)
APTT PPP: 85 SECONDS (ref 23–37)
APTT PPP: 85 SECONDS (ref 23–37)
ATRIAL RATE: 92 BPM
ATRIAL RATE: 92 BPM
BASOPHILS # BLD AUTO: 0.03 THOUSANDS/ÂΜL (ref 0–0.1)
BASOPHILS # BLD AUTO: 0.03 THOUSANDS/ÂΜL (ref 0–0.1)
BASOPHILS NFR BLD AUTO: 0 % (ref 0–1)
BASOPHILS NFR BLD AUTO: 0 % (ref 0–1)
BUN SERPL-MCNC: 25 MG/DL (ref 5–25)
BUN SERPL-MCNC: 25 MG/DL (ref 5–25)
CALCIUM SERPL-MCNC: 8.4 MG/DL (ref 8.4–10.2)
CALCIUM SERPL-MCNC: 8.4 MG/DL (ref 8.4–10.2)
CHLORIDE SERPL-SCNC: 102 MMOL/L (ref 96–108)
CHLORIDE SERPL-SCNC: 102 MMOL/L (ref 96–108)
CO2 SERPL-SCNC: 35 MMOL/L (ref 21–32)
CO2 SERPL-SCNC: 35 MMOL/L (ref 21–32)
CREAT SERPL-MCNC: 1.02 MG/DL (ref 0.6–1.3)
CREAT SERPL-MCNC: 1.02 MG/DL (ref 0.6–1.3)
EOSINOPHIL # BLD AUTO: 0.09 THOUSAND/ÂΜL (ref 0–0.61)
EOSINOPHIL # BLD AUTO: 0.09 THOUSAND/ÂΜL (ref 0–0.61)
EOSINOPHIL NFR BLD AUTO: 1 % (ref 0–6)
EOSINOPHIL NFR BLD AUTO: 1 % (ref 0–6)
ERYTHROCYTE [DISTWIDTH] IN BLOOD BY AUTOMATED COUNT: 18.7 % (ref 11.6–15.1)
ERYTHROCYTE [DISTWIDTH] IN BLOOD BY AUTOMATED COUNT: 18.7 % (ref 11.6–15.1)
ERYTHROCYTE [DISTWIDTH] IN BLOOD BY AUTOMATED COUNT: 18.8 % (ref 11.6–15.1)
ERYTHROCYTE [DISTWIDTH] IN BLOOD BY AUTOMATED COUNT: 18.8 % (ref 11.6–15.1)
GFR SERPL CREATININE-BSD FRML MDRD: 44 ML/MIN/1.73SQ M
GFR SERPL CREATININE-BSD FRML MDRD: 44 ML/MIN/1.73SQ M
GLUCOSE SERPL-MCNC: 107 MG/DL (ref 65–140)
GLUCOSE SERPL-MCNC: 107 MG/DL (ref 65–140)
HCT VFR BLD AUTO: 24.4 % (ref 34.8–46.1)
HCT VFR BLD AUTO: 24.4 % (ref 34.8–46.1)
HCT VFR BLD AUTO: 28.5 % (ref 34.8–46.1)
HCT VFR BLD AUTO: 28.5 % (ref 34.8–46.1)
HGB BLD-MCNC: 7.4 G/DL (ref 11.5–15.4)
HGB BLD-MCNC: 7.4 G/DL (ref 11.5–15.4)
HGB BLD-MCNC: 8.5 G/DL (ref 11.5–15.4)
HGB BLD-MCNC: 8.5 G/DL (ref 11.5–15.4)
IMM GRANULOCYTES # BLD AUTO: 0.03 THOUSAND/UL (ref 0–0.2)
IMM GRANULOCYTES # BLD AUTO: 0.03 THOUSAND/UL (ref 0–0.2)
IMM GRANULOCYTES NFR BLD AUTO: 0 % (ref 0–2)
IMM GRANULOCYTES NFR BLD AUTO: 0 % (ref 0–2)
INR PPP: 1.05 (ref 0.84–1.19)
INR PPP: 1.05 (ref 0.84–1.19)
LYMPHOCYTES # BLD AUTO: 1.71 THOUSANDS/ÂΜL (ref 0.6–4.47)
LYMPHOCYTES # BLD AUTO: 1.71 THOUSANDS/ÂΜL (ref 0.6–4.47)
LYMPHOCYTES NFR BLD AUTO: 20 % (ref 14–44)
LYMPHOCYTES NFR BLD AUTO: 20 % (ref 14–44)
MAGNESIUM SERPL-MCNC: 2.1 MG/DL (ref 1.9–2.7)
MAGNESIUM SERPL-MCNC: 2.1 MG/DL (ref 1.9–2.7)
MCH RBC QN AUTO: 29.3 PG (ref 26.8–34.3)
MCH RBC QN AUTO: 29.3 PG (ref 26.8–34.3)
MCH RBC QN AUTO: 30.1 PG (ref 26.8–34.3)
MCH RBC QN AUTO: 30.1 PG (ref 26.8–34.3)
MCHC RBC AUTO-ENTMCNC: 29.8 G/DL (ref 31.4–37.4)
MCHC RBC AUTO-ENTMCNC: 29.8 G/DL (ref 31.4–37.4)
MCHC RBC AUTO-ENTMCNC: 30.3 G/DL (ref 31.4–37.4)
MCHC RBC AUTO-ENTMCNC: 30.3 G/DL (ref 31.4–37.4)
MCV RBC AUTO: 98 FL (ref 82–98)
MCV RBC AUTO: 98 FL (ref 82–98)
MCV RBC AUTO: 99 FL (ref 82–98)
MCV RBC AUTO: 99 FL (ref 82–98)
MONOCYTES # BLD AUTO: 0.82 THOUSAND/ÂΜL (ref 0.17–1.22)
MONOCYTES # BLD AUTO: 0.82 THOUSAND/ÂΜL (ref 0.17–1.22)
MONOCYTES NFR BLD AUTO: 9 % (ref 4–12)
MONOCYTES NFR BLD AUTO: 9 % (ref 4–12)
NEUTROPHILS # BLD AUTO: 6.11 THOUSANDS/ÂΜL (ref 1.85–7.62)
NEUTROPHILS # BLD AUTO: 6.11 THOUSANDS/ÂΜL (ref 1.85–7.62)
NEUTS SEG NFR BLD AUTO: 70 % (ref 43–75)
NEUTS SEG NFR BLD AUTO: 70 % (ref 43–75)
NRBC BLD AUTO-RTO: 0 /100 WBCS
NRBC BLD AUTO-RTO: 0 /100 WBCS
P AXIS: 78 DEGREES
P AXIS: 78 DEGREES
PHOSPHATE SERPL-MCNC: 4 MG/DL (ref 2.3–4.1)
PHOSPHATE SERPL-MCNC: 4 MG/DL (ref 2.3–4.1)
PLATELET # BLD AUTO: 224 THOUSANDS/UL (ref 149–390)
PLATELET # BLD AUTO: 224 THOUSANDS/UL (ref 149–390)
PLATELET # BLD AUTO: 248 THOUSANDS/UL (ref 149–390)
PLATELET # BLD AUTO: 248 THOUSANDS/UL (ref 149–390)
PMV BLD AUTO: 11.1 FL (ref 8.9–12.7)
PMV BLD AUTO: 11.1 FL (ref 8.9–12.7)
PMV BLD AUTO: 11.6 FL (ref 8.9–12.7)
PMV BLD AUTO: 11.6 FL (ref 8.9–12.7)
POTASSIUM SERPL-SCNC: 4 MMOL/L (ref 3.5–5.3)
POTASSIUM SERPL-SCNC: 4 MMOL/L (ref 3.5–5.3)
PR INTERVAL: 206 MS
PR INTERVAL: 206 MS
PROTHROMBIN TIME: 14 SECONDS (ref 11.6–14.5)
PROTHROMBIN TIME: 14 SECONDS (ref 11.6–14.5)
QRS AXIS: 9 DEGREES
QRS AXIS: 9 DEGREES
QRSD INTERVAL: 74 MS
QRSD INTERVAL: 74 MS
QT INTERVAL: 366 MS
QT INTERVAL: 366 MS
QTC INTERVAL: 452 MS
QTC INTERVAL: 452 MS
RBC # BLD AUTO: 2.46 MILLION/UL (ref 3.81–5.12)
RBC # BLD AUTO: 2.46 MILLION/UL (ref 3.81–5.12)
RBC # BLD AUTO: 2.9 MILLION/UL (ref 3.81–5.12)
RBC # BLD AUTO: 2.9 MILLION/UL (ref 3.81–5.12)
SODIUM SERPL-SCNC: 141 MMOL/L (ref 135–147)
SODIUM SERPL-SCNC: 141 MMOL/L (ref 135–147)
T WAVE AXIS: 2 DEGREES
T WAVE AXIS: 2 DEGREES
VENTRICULAR RATE: 92 BPM
VENTRICULAR RATE: 92 BPM
WBC # BLD AUTO: 10.28 THOUSAND/UL (ref 4.31–10.16)
WBC # BLD AUTO: 10.28 THOUSAND/UL (ref 4.31–10.16)
WBC # BLD AUTO: 8.79 THOUSAND/UL (ref 4.31–10.16)
WBC # BLD AUTO: 8.79 THOUSAND/UL (ref 4.31–10.16)

## 2023-08-07 PROCEDURE — C1713 ANCHOR/SCREW BN/BN,TIS/BN: HCPCS | Performed by: STUDENT IN AN ORGANIZED HEALTH CARE EDUCATION/TRAINING PROGRAM

## 2023-08-07 PROCEDURE — 83735 ASSAY OF MAGNESIUM: CPT | Performed by: PHYSICIAN ASSISTANT

## 2023-08-07 PROCEDURE — 80048 BASIC METABOLIC PNL TOTAL CA: CPT | Performed by: PHYSICIAN ASSISTANT

## 2023-08-07 PROCEDURE — 85025 COMPLETE CBC W/AUTO DIFF WBC: CPT | Performed by: PHYSICIAN ASSISTANT

## 2023-08-07 PROCEDURE — 85610 PROTHROMBIN TIME: CPT | Performed by: PHYSICIAN ASSISTANT

## 2023-08-07 PROCEDURE — 85730 THROMBOPLASTIN TIME PARTIAL: CPT | Performed by: PHYSICIAN ASSISTANT

## 2023-08-07 PROCEDURE — 85027 COMPLETE CBC AUTOMATED: CPT | Performed by: PHYSICIAN ASSISTANT

## 2023-08-07 PROCEDURE — 99223 1ST HOSP IP/OBS HIGH 75: CPT | Performed by: NURSE PRACTITIONER

## 2023-08-07 PROCEDURE — NC001 PR NO CHARGE: Performed by: PHYSICIAN ASSISTANT

## 2023-08-07 PROCEDURE — 27245 TREAT THIGH FRACTURE: CPT | Performed by: STUDENT IN AN ORGANIZED HEALTH CARE EDUCATION/TRAINING PROGRAM

## 2023-08-07 PROCEDURE — 85730 THROMBOPLASTIN TIME PARTIAL: CPT | Performed by: SURGERY

## 2023-08-07 PROCEDURE — 0QS736Z REPOSITION LEFT UPPER FEMUR WITH INTRAMEDULLARY INTERNAL FIXATION DEVICE, PERCUTANEOUS APPROACH: ICD-10-PCS | Performed by: STUDENT IN AN ORGANIZED HEALTH CARE EDUCATION/TRAINING PROGRAM

## 2023-08-07 PROCEDURE — 99233 SBSQ HOSP IP/OBS HIGH 50: CPT | Performed by: PHYSICIAN ASSISTANT

## 2023-08-07 PROCEDURE — 84100 ASSAY OF PHOSPHORUS: CPT | Performed by: PHYSICIAN ASSISTANT

## 2023-08-07 PROCEDURE — 73502 X-RAY EXAM HIP UNI 2-3 VIEWS: CPT

## 2023-08-07 DEVICE — 11MM/130 DEG TI CANN TFNA 170MM - STERILE
Type: IMPLANTABLE DEVICE | Site: LEG | Status: FUNCTIONAL
Brand: TFN-ADVANCE

## 2023-08-07 DEVICE — TFNA FENESTRATED HELICAL BLADE 90MM - STERILE
Type: IMPLANTABLE DEVICE | Site: LEG | Status: FUNCTIONAL
Brand: TFN-ADVANCE

## 2023-08-07 DEVICE — LOCKING SCREW FOR IM NAIL Ø 5MM/ 34MM/ XL25/ STERILE: Type: IMPLANTABLE DEVICE | Site: LEG | Status: FUNCTIONAL

## 2023-08-07 RX ORDER — MAGNESIUM HYDROXIDE 1200 MG/15ML
LIQUID ORAL AS NEEDED
Status: DISCONTINUED | OUTPATIENT
Start: 2023-08-07 | End: 2023-08-07 | Stop reason: HOSPADM

## 2023-08-07 RX ORDER — ROCURONIUM BROMIDE 10 MG/ML
INJECTION, SOLUTION INTRAVENOUS AS NEEDED
Status: DISCONTINUED | OUTPATIENT
Start: 2023-08-07 | End: 2023-08-07

## 2023-08-07 RX ORDER — PROPOFOL 10 MG/ML
INJECTION, EMULSION INTRAVENOUS AS NEEDED
Status: DISCONTINUED | OUTPATIENT
Start: 2023-08-07 | End: 2023-08-07

## 2023-08-07 RX ORDER — TRANEXAMIC ACID 10 MG/ML
1000 INJECTION, SOLUTION INTRAVENOUS
Status: DISCONTINUED | OUTPATIENT
Start: 2023-08-07 | End: 2023-08-08

## 2023-08-07 RX ORDER — HEPARIN SODIUM 10000 [USP'U]/100ML
3-30 INJECTION, SOLUTION INTRAVENOUS
Status: DISCONTINUED | OUTPATIENT
Start: 2023-08-07 | End: 2023-08-09

## 2023-08-07 RX ORDER — HYDROMORPHONE HCL/PF 1 MG/ML
0.5 SYRINGE (ML) INJECTION
Status: DISCONTINUED | OUTPATIENT
Start: 2023-08-07 | End: 2023-08-07 | Stop reason: HOSPADM

## 2023-08-07 RX ORDER — FENTANYL CITRATE/PF 50 MCG/ML
50 SYRINGE (ML) INJECTION
Status: DISCONTINUED | OUTPATIENT
Start: 2023-08-07 | End: 2023-08-07 | Stop reason: HOSPADM

## 2023-08-07 RX ORDER — METOCLOPRAMIDE HYDROCHLORIDE 5 MG/ML
10 INJECTION INTRAMUSCULAR; INTRAVENOUS ONCE AS NEEDED
Status: DISCONTINUED | OUTPATIENT
Start: 2023-08-07 | End: 2023-08-07 | Stop reason: HOSPADM

## 2023-08-07 RX ORDER — LIDOCAINE HYDROCHLORIDE 10 MG/ML
INJECTION, SOLUTION EPIDURAL; INFILTRATION; INTRACAUDAL; PERINEURAL AS NEEDED
Status: DISCONTINUED | OUTPATIENT
Start: 2023-08-07 | End: 2023-08-07

## 2023-08-07 RX ORDER — SODIUM CHLORIDE, SODIUM LACTATE, POTASSIUM CHLORIDE, CALCIUM CHLORIDE 600; 310; 30; 20 MG/100ML; MG/100ML; MG/100ML; MG/100ML
INJECTION, SOLUTION INTRAVENOUS CONTINUOUS PRN
Status: DISCONTINUED | OUTPATIENT
Start: 2023-08-07 | End: 2023-08-07

## 2023-08-07 RX ORDER — DEXAMETHASONE SODIUM PHOSPHATE 10 MG/ML
INJECTION, SOLUTION INTRAMUSCULAR; INTRAVENOUS AS NEEDED
Status: DISCONTINUED | OUTPATIENT
Start: 2023-08-07 | End: 2023-08-07

## 2023-08-07 RX ORDER — CEFAZOLIN SODIUM 2 G/50ML
2000 SOLUTION INTRAVENOUS EVERY 8 HOURS
Status: COMPLETED | OUTPATIENT
Start: 2023-08-07 | End: 2023-08-08

## 2023-08-07 RX ORDER — ONDANSETRON 2 MG/ML
INJECTION INTRAMUSCULAR; INTRAVENOUS AS NEEDED
Status: DISCONTINUED | OUTPATIENT
Start: 2023-08-07 | End: 2023-08-07

## 2023-08-07 RX ORDER — FENTANYL CITRATE 50 UG/ML
INJECTION, SOLUTION INTRAMUSCULAR; INTRAVENOUS AS NEEDED
Status: DISCONTINUED | OUTPATIENT
Start: 2023-08-07 | End: 2023-08-07

## 2023-08-07 RX ORDER — CEFAZOLIN SODIUM 2 G/50ML
2000 SOLUTION INTRAVENOUS
Status: COMPLETED | OUTPATIENT
Start: 2023-08-07 | End: 2023-08-07

## 2023-08-07 RX ADMIN — PROPOFOL 50 MG: 10 INJECTION, EMULSION INTRAVENOUS at 08:10

## 2023-08-07 RX ADMIN — PROPOFOL 20 MG: 10 INJECTION, EMULSION INTRAVENOUS at 08:36

## 2023-08-07 RX ADMIN — ISOSORBIDE MONONITRATE 30 MG: 30 TABLET, EXTENDED RELEASE ORAL at 09:54

## 2023-08-07 RX ADMIN — FENTANYL CITRATE 12.5 MCG: 50 INJECTION INTRAMUSCULAR; INTRAVENOUS at 08:57

## 2023-08-07 RX ADMIN — SUGAMMADEX 100 MG: 100 INJECTION, SOLUTION INTRAVENOUS at 08:56

## 2023-08-07 RX ADMIN — HEPARIN SODIUM 18 UNITS/KG/HR: 10000 INJECTION, SOLUTION INTRAVENOUS at 14:39

## 2023-08-07 RX ADMIN — CEFAZOLIN SODIUM 2000 MG: 2 SOLUTION INTRAVENOUS at 15:19

## 2023-08-07 RX ADMIN — FENTANYL CITRATE 12.5 MCG: 50 INJECTION INTRAMUSCULAR; INTRAVENOUS at 08:36

## 2023-08-07 RX ADMIN — ACETAMINOPHEN 975 MG: 325 TABLET, FILM COATED ORAL at 22:41

## 2023-08-07 RX ADMIN — SODIUM CHLORIDE, SODIUM LACTATE, POTASSIUM CHLORIDE, AND CALCIUM CHLORIDE: .6; .31; .03; .02 INJECTION, SOLUTION INTRAVENOUS at 08:05

## 2023-08-07 RX ADMIN — PROPOFOL 30 MG: 10 INJECTION, EMULSION INTRAVENOUS at 08:11

## 2023-08-07 RX ADMIN — DEXAMETHASONE SODIUM PHOSPHATE 6 MG: 10 INJECTION, SOLUTION INTRAMUSCULAR; INTRAVENOUS at 08:36

## 2023-08-07 RX ADMIN — ACETAMINOPHEN 975 MG: 325 TABLET, FILM COATED ORAL at 06:02

## 2023-08-07 RX ADMIN — ROCURONIUM BROMIDE 30 MG: 10 INJECTION, SOLUTION INTRAVENOUS at 08:11

## 2023-08-07 RX ADMIN — FOLIC ACID 1 MG: 1 TABLET ORAL at 09:54

## 2023-08-07 RX ADMIN — ONDANSETRON 4 MG: 2 INJECTION INTRAMUSCULAR; INTRAVENOUS at 08:38

## 2023-08-07 RX ADMIN — FENTANYL CITRATE 12.5 MCG: 50 INJECTION INTRAMUSCULAR; INTRAVENOUS at 08:35

## 2023-08-07 RX ADMIN — FENTANYL CITRATE 12.5 MCG: 50 INJECTION INTRAMUSCULAR; INTRAVENOUS at 08:32

## 2023-08-07 RX ADMIN — Medication 2000 UNITS: at 09:54

## 2023-08-07 RX ADMIN — GABAPENTIN 100 MG: 100 CAPSULE ORAL at 22:40

## 2023-08-07 RX ADMIN — AMLODIPINE BESYLATE 5 MG: 5 TABLET ORAL at 09:54

## 2023-08-07 RX ADMIN — CEFAZOLIN SODIUM 2000 MG: 2 SOLUTION INTRAVENOUS at 08:09

## 2023-08-07 RX ADMIN — ACETAMINOPHEN 975 MG: 325 TABLET, FILM COATED ORAL at 14:24

## 2023-08-07 RX ADMIN — LIDOCAINE HYDROCHLORIDE 50 MG: 10 INJECTION, SOLUTION EPIDURAL; INFILTRATION; INTRACAUDAL; PERINEURAL at 08:10

## 2023-08-07 RX ADMIN — FENTANYL CITRATE 12.5 MCG: 50 INJECTION INTRAMUSCULAR; INTRAVENOUS at 08:39

## 2023-08-07 NOTE — PROGRESS NOTES
Progress Note - Orthopedics   Cassandra Larkin 80 y.o. female MRN: [id number]  Unit/Bed#: S -01      Subjective:    80 y. o.female s/p a fall at home with pain in the left hip. She denies any other acute complaints today.     Labs:  0   Lab Value Date/Time    HCT 28.5 (L) 08/07/2023 0448    HCT 34.5 (L) 08/06/2023 1510    HCT 34 (L) 08/06/2023 1028    HGB 8.5 (L) 08/07/2023 0448    HGB 10.3 (L) 08/06/2023 1510    HGB 11.6 08/06/2023 1028    INR 1.11 08/06/2023 1757    WBC 8.79 08/07/2023 0448    WBC 12.20 (H) 08/06/2023 1510       Meds:    Current Facility-Administered Medications:   •  acetaminophen (TYLENOL) tablet 975 mg, 975 mg, Oral, Q8H [address], Katharine Adrian MD, 975 mg at 08/07/23 0602  •  amLODIPine (NORVASC) tablet 5 mg, 5 mg, Oral, Daily, Katharine Adrian MD, 5 mg at 08/07/23 9374  •  ceFAZolin (ANCEF) IVPB (premix in dextrose) 2,000 mg 50 mL, 2,000 mg, Intravenous, Q8H, Katharine Adrian MD  •  cholecalciferol (VITAMIN D3) tablet 2,000 Units, 2,000 Units, Oral, Daily, Katharine Adrian MD, 2,000 Units at 92/19/87 5693  •  folic acid (FOLVITE) tablet 1 mg, 1 mg, Oral, Daily, Katharine Adrian MD, 1 mg at 08/07/23 3219  •  gabapentin (NEURONTIN) capsule 100 mg, 100 mg, Oral, HS, Katharine Adrian MD, 100 mg at 08/06/23 9237  •  HYDROmorphone HCl (DILAUDID) injection 0.2 mg, 0.2 mg, Intravenous, Q4H PRN, Katharine Adrian MD  •  isosorbide mononitrate (IMDUR) 24 hr tablet 30 mg, 30 mg, Oral, Daily, Katharine Adrian MD, 30 mg at 08/07/23 6003  •  lactated ringers bolus 1,000 mL, 1,000 mL, Intravenous, Once PRN **AND** lactated ringers bolus 1,000 mL, 1,000 mL, Intravenous, Once PRN, Katharine Adrian MD  •  metoprolol succinate (TOPROL-XL) 24 hr tablet 25 mg, 25 mg, Oral, HS, Katharine Adrian MD, 25 mg at 08/06/23 2106  •  ondansetron [facility]) injection 4 mg, 4 mg, Intravenous, Q4H PRN, Katharine Adrian MD  •  oxyCODONE (ROXICODONE) IR tablet 5 mg, 5 mg, Oral, Q4H PRN, Katharine Adrian MD, 5 mg at 08/06/23 1527  •  oxyCODONE (ROXICODONE) split tablet 2.5 mg, 2.5 mg, Oral, Q4H PRN, Karthik Laws MD  •  polyethylene glycol (MIRALAX) packet 17 g, 17 g, Oral, Daily, Karthik Laws MD  •  sodium chloride 0.9 % bolus 1,000 mL, 1,000 mL, Intravenous, Once PRN **AND** sodium chloride 0.9 % bolus 1,000 mL, 1,000 mL, Intravenous, Once PRN, Karthik Laws MD  •  Tranexamic Acid-NaCl (CYKLOKAPRON) 1000-0.7 MG/100ML-% injection 1,000 mg, 1,000 mg, Intravenous, On Call To OR, Karthik Laws MD    Blood Culture:   No results found for: "Ce Goode"    Wound Culture:   No results found for: "WOUNDCULT"    Ins and Outs:  I/O last 24 hours: In: 710 [I.V.:560; IV Piggyback:150]  Out: 10 [Blood:10]          Physical:  Vitals:    08/07/23 1228   BP: 102/57   Pulse: 81   Resp: 18   Temp: 98 °F (36.7 °C)   SpO2: 90%     Musculoskeletal: left Lower Extremity  · Skin intact . No erythema or ecchymosis. · TTP left hip  · Sensation intact to distally  · Motor intact to +FHL/EHL, +ankle dorsi/plantar flexion  · 2+ DP pulse  · Digits warm and well perfused  · Capillary refill < 2 seconds    Assessment:    89 y. o.female with left intertrochanteric femur fracture . Patient doing well.      Plan:  · NWB LLE  · OR today with Dr. Neelima Claire for TFN  · Will monitor for ABLA and administer IVF/prbc as indicated for Greater than 2 gram drop or Hgb < 7  · PT/OT  · Pain control  · Dispo: Ortho will follow    Soco Bradford PA-C

## 2023-08-07 NOTE — CONSULTS
Consultation - Geriatric Medicine   Jonathon Mejia 80 y.o. female MRN: [id number]  Unit/Bed#: S -01 Encounter: [id number]      Assessment/Plan   Fall  · S/p mechanical fall at home in her kitchen in which she was trying to turn on her light and tripped and fell  · Trauma work-up revealed fracture of the left femur  [place] fall precautions   Assess patient frequently for physical needs, encourage use of assistant devices as needed and directed by PT/OT  Identify cognitive and physical deficits and behaviors that affect risk of falls  · Consider moving patient closer to nursing station to monitor more closely for impulsive behavior which may increase risk of falls  · Educate patient/family on patient safety including physical limitations and importance of using call bell for assistance   · Modify environment to reduce risk of injury including disconnecting from pole when not in use, ensuring adequate lighting in room and restroom, ensuring that path to restroom is clear and free of trip hazards  · PT/OT consulted to assist with strengthening/mobility and assist with discharge planning to appropriate level of care    Fracture of left femur  · S/p fall  · X-ray femur showed acute minimally displaced intertrochanteric fracture of the left hip  · Underwent surgical fixation of the left peritrochanteric femur fracture with intramedullary nail  · Multimodal pain regimen including geriatric pain protocol  · PT/OT consult    Acute blood loss anemia  · Hemoglobin today 8.5 from 10.3 on admission  · Had surgery today  · Continue to monitor CBC    History of PE  · Recent admission for PE at [facility]  · She was placed on Eliquis and supplemental oxygen  · Eliquis was on hold for surgery  · Management per primary    Insomnia  First-line treatment is behavior modification  Maintain sleep-wake cycle, avoid nighttime interruptions  Avoid caffeine throughout the day  Avoid napping throughout the day  Encourage physical activity throughout the day  · Avoid sedative hypnotics including benzodiazepines and benadryl  · If needed could trial melatonin 3 mg nightly    Cognitive screening  Patient is alert oriented to person, place, and situation-disoriented to time  No cognitive testing on file  No recent vitamin B12 or TSH levels on file-recommend checking with next lab work  CT of the head showed no intracranial hemorrhage or calvarial fracture. Moderate microangiopathic. At risk for delirium due to hospitalization, sleep disturbance, medication, anesthesia, surgery, pain  Recommend delirium precautions  Maintain sleep-wake cycle, avoid nighttime interruptions  minimize overnight interruptions, group overnight vitals/labs/nursing checks as possible  dim lights, close blinds and turn off tv to minimize stimulation and encourage sleep environment in evenings  Provide adequate pain control  Avoid urinary retention and constipation  Provide frequent and early mobilization  Provide frequent redirection and reorientation as needed  Avoid medications that may worsen or precipitate delirium such as tramadol, benzodiazepines, anticholinergics, and Benadryl  Redirect unwanted behaviors as first-line therapy, avoid physical restraints as able to  · Continue to monitor      Constipation  Patient complains of constipation  Last BM was prior to hospitalization  Is currently on MiraLAX daily  Encourage adequate p.o.  Hydration  Encourage prune juice as tolerated    Frailty  Clinical Frail Scale: 6 living with moderate frailty  Encourage adequate hydration and nutrition, including protein in meals  OOB as tolerated  PT/OT consulted  · Encourage early and frequent mobilization    Ambulatory dysfunction  At a baseline ambulates with rosa  PT/OT following  Fall precautions  Out of bed as tolerated  Encourage early and frequent mobilization  Encourage adequate hydration and nutrition  Provide adequate pain management   Goal is undetermined  · Continue with PT/OT for continued strength and balance training     Home medication review  Isosorbide mononitrate 30 mg daily  Metoprolol succinate 25 mg daily at bedtime        Per the pharmacy these were the only medications picked up from the pharmacy during 5/1/2023 to 8/7/2023    [facility]ally [facility] [phone]    History of Present Illness   Physician Requesting Consult: Kurt Hernandez MD  Reason for Consult / Principal Problem: fall  Hx and PE limited by: none  Additional history obtained from: Chart review and patient evaluation      HPI: Pepe Rivas is a 8 y.o. year old female who presents with history of CAD, hypertension, PE, and ambulatory dysfunction. She presented to the ER after a mechanical fall at home. Trauma work-up revealed femur fracture. Patient lives at home alone in a condominium. She reports there is no stairs in the condo. She reports she recently started using a walker for ambulation. She denies any other falls. She was independent for ADLs. There are grab bars and shower chairs available. She reports she has someone who helps her with her meals, medication, and finances. She recently had a cleaning lady to come in. She denies any anxiety, depression, or insomnia. She reports her appetite has been stable, with no weight loss. She does not drive. She is friends to take her to and from any appointments she may have. She reports some mild forgetfulness. Upon examination patient was lying in bed, resting. She appeared comfortable and was in no acute distress. She reports her pain is well controlled on exam.  She slept well last night. Her appetite is slightly decreased. Per nursing no acute concerns or issues at this time.     Inpatient consult to Gerontology  Consult performed by: STEFFANIE Wright  Consult ordered by: Vahe Rebolledo PA-C          Review of Systems   Constitutional: Negative for activity change, appetite change, chills, fatigue and fever. HENT: Negative for trouble swallowing. Eyes: Negative for visual disturbance. Respiratory: Negative for cough and shortness of breath. Cardiovascular: Negative for chest pain and palpitations. Gastrointestinal: Negative for abdominal pain, constipation, diarrhea, nausea and vomiting. Genitourinary: Negative for difficulty urinating, dysuria and hematuria. Musculoskeletal: Positive for arthralgias and gait problem. Negative for back pain. Skin: Negative for color change and rash. Neurological: Positive for weakness. Negative for dizziness and headaches. Psychiatric/Behavioral: Positive for confusion and sleep disturbance. Historical Information   Past Medical History:   Diagnosis Date   • CAD (coronary artery disease)    • Hypertension    • Pulmonary embolism ([address])      History reviewed. No pertinent surgical history. Social History   Social History     Substance and Sexual Activity   Alcohol Use Yes   • Alcohol/week: 2.0 standard drinks of alcohol   • Types: 2 Glasses of wine per week     Social History     Substance and Sexual Activity   Drug Use Never     Social History     Tobacco Use   Smoking Status Never   Smokeless Tobacco Never     Family History: History reviewed. No pertinent family history. Meds/Allergies   all current active meds have been reviewed    No Known Allergies    Objective     Intake/Output Summary (Last 24 hours) at 8/7/2023 1355  Last data filed at 8/7/2023 0905  Gross per 24 hour   Intake 680 ml   Output 10 ml   Net 670 ml     Invasive Devices     Peripheral Intravenous Line  Duration           Peripheral IV 08/06/23 Left;Upper;Ventral (anterior) Arm <1 day                Physical Exam  Vitals reviewed. Constitutional:       General: She is not in acute distress. Appearance: She is well-developed. She is not ill-appearing. HENT:      Head: Normocephalic and atraumatic.    Cardiovascular:      Rate and Rhythm: Normal rate and regular rhythm. Heart sounds: Murmur heard. Pulmonary:      Effort: Pulmonary effort is normal. No respiratory distress. Breath sounds: Normal breath sounds. Abdominal:      General: Bowel sounds are normal. There is no distension. Palpations: Abdomen is soft. There is mass. Tenderness: There is no abdominal tenderness. Musculoskeletal:         General: Tenderness and signs of injury present. No swelling. Right lower leg: No edema. Left lower leg: No edema. Skin:     General: Skin is warm and dry. Neurological:      General: No focal deficit present. Mental Status: She is alert and oriented to person, place, and time. Mental status is at baseline. Cranial Nerves: No cranial nerve deficit. Motor: Weakness present. Gait: Gait abnormal.      Comments: Forgetful and confused at times   Psychiatric:         Mood and Affect: Mood normal.         Lab Results:   I have personally reviewed pertinent lab results including the following:  -CBC, BMP, magnesium, phosphorus    I have personally reviewed the following imaging study reports in PACS:  -CT head, CT spine, x-ray femur    Therapies:   PT: consulted  OT: consulted    VTE Prophylaxis: Sequential compression device (Venodyne)     Code Status: Level 1 - Full Code  Advance Directive and Living Will:    yes  Power of :  yes  POLST:  no    Family and Social Support:   Living Arrangements: Lives Alone (with 24/7 private caregivers)  Support Systems: Friend  Assistance Needed: 0 (says she needs it)  Type of Current Residence: [address] [place] [address]: No  Discharge planning discussed with[de-identified] Jarrell/Friend  [place] of Choice: Yes      Goals of Care: Undetermined     Please note:  Voice-recognition software may have been used in the preparation of this document. Occasional wrong word or "sound-alike" substitutions may have occurred due to the inherent limitations of voice recognition software. Interpretation should be guided by context.

## 2023-08-07 NOTE — ASSESSMENT & PLAN NOTE
- Left femoral fracture, present on admission.  - Orthopedic surgery evaluation pending - plan for IM nail today   - NPO   - Heparin gtt on hold since 3am  - TXA given  - Maintain non-weightbearing status on the left extremity.   - Monitor left lower extremity neurovascular exam.  - Continue multimodal analgesic regimen.  - Continue DVT prophylaxis. Hold Eliquis. - PT and OT evaluation and treatment as indicated. - Outpatient follow up with Orthopedic surgery for re-evaluation.

## 2023-08-07 NOTE — OCCUPATIONAL THERAPY NOTE
Occupational Therapy Cancellation Note        Patient Name: Smitha Siegel  Today's Date: 8/7/2023 08/07/23 7495   Note Type   Note type Cancelled Session   Cancel Reasons Patient to operating room   Additional Comments OT orders recieved, chart reviewed. Pt planned to OR for fixation of L femur.  Will f/u post operatively, appreciate updated WBS and activity orders as appropriate     Beau Leisure, OT

## 2023-08-07 NOTE — CASE MANAGEMENT
Case Management Assessment & Discharge Planning Note    Patient name Marina Owens  Location S /S -01 MRN [id number]  : 1920 Date 2023       Current Admission Date: 2023  Current Admission Diagnosis:Fracture of left femur [facility])   Patient Active Problem List    Diagnosis Date Noted   • Acute blood loss anemia 2023   • Fracture of left femur ([address]) 2023   • Fall 2023   • Acute pain due to trauma 2023   • Pulmonary embolism ([address]) 2023   • Hypertension 2023      LOS (days): 1  Geometric Mean LOS (GMLOS) (days):   Days to GMLOS:     OBJECTIVE:    Risk of Unplanned Readmission Score: 11.57         Current admission status: Inpatient       Preferred Pharmacy: No Pharmacies Listed  Primary Care Provider: STEFFANIE Meenses    Primary Insurance: MEDICARE  Secondary Insurance: BLUE CROSS    ASSESSMENT:  [address], [address]    Primary Phone: [phone] (Mobile)  Home Phone: [phone]               Advance Directives  Does patient have a [address]?: Yes  Does patient have Advance Directives?: Yes  Advance Directives: Power of  for health care, Living will  Primary Contact: Candice         Readmission Root Cause  30 Day Readmission: No    Patient Information  Admitted from[de-identified] Home  Mental Status: Alert  During Assessment patient was accompanied by: Not accompanied during assessment  Assessment information provided by[de-identified] Other - please comment (Bunny)  Primary Caregiver: Other (Comment) (Pt has  private caregivers)  Caregiver's Name[de-identified] Private paid caregivers  Support Systems:  [facility] of Residence: [facility] [address] [facility] do you live in?: [facility] entry access options.  Select all that apply.: No steps to enter home  Type of Current Residence: [place]  In the last 12 months, was there a time when you were not able to pay the mortgage or rent on time?: No  In the last 12 months, how many places have you lived?: 1  In the last 12 months, was there a time when you did not have a steady place to sleep or slept in a shelter (including now)?: No  Homeless/housing insecurity resource given?: N/A  Living Arrangements: Lives Alone (with 24/7 private caregivers)  Is patient a ?: No    Activities of Daily Living Prior to Admission  Functional Status: Independent  Completes ADLs independently?: No  Level of ADL dependence: Assistance  Ambulates independently?: Yes  Does patient use assisted devices?: Yes  Assisted Devices (DME) used: [employer]  Does patient currently own DME?: Yes  What DME does the patient currently own?: Anabell Denny  Does patient have a history of Outpatient Therapy (PT/OT)?: No  Does the patient have a history of Short-Term Rehab?: No  Does patient have a history of HHC?: Yes  Does patient currently have [address]?: No         Patient Information Continued  Income Source: SSI/SSD  Does patient have prescription coverage?: Yes  Within the past 12 months, you worried that your food would run out before you got the money to buy more.: Never true  Within the past 12 months, the food you bought just didn't last and you didn't have money to get more.: Never true  Food insecurity resource given?: N/A  Does patient receive dialysis treatments?: No  Does patient have a history of substance abuse?: No  Does patient have a history of Mental Health Diagnosis?: No    PHQ 2/9 Screening   Reviewed PHQ 2/9 Depression Screening Score?: No    Means of Transportation  [facility]] Other (Comment)  In the past 12 months, has lack of transportation kept you from medical appointments or from getting medications?: No  In the past 12 months, has lack of transportation kept you from meetings, work, or from getting things needed for daily living?: No  Was application for public transport provided?: N/A        DISCHARGE DETAILS:    Discharge planning discussed with[de-identified] Candice-POA/Friend  Freedom of Choice: Yes  Comments - Freedom of Choice: Candice provided the information for the completion of the Pt's assessment. Tatyana Byrnes reported being in agreement with SNF if recommended.   CM contacted family/caregiver?: Yes             Contacts  Patient Contacts: Tatyana Byrnes  Relationship to Patient[de-identified] Other (Comment)  Contact Method: Phone  Phone Number: [phone]  Reason/Outcome: Continuity of [address]         Is the patient interested in [facility] [address] at discharge?:  (TBD)    DME Referral Provided  Referral made for DME?: No    Other Referral/Resources/Interventions Provided:  Interventions:  (TBD)         Treatment Team Recommendation:  (TBD)  Discharge Destination Plan[de-identified]  (TBD)

## 2023-08-07 NOTE — ANESTHESIA POSTPROCEDURE EVALUATION
Post-Op Assessment Note    CV Status:  Stable  Pain Score: 0    Pain management: adequate     Mental Status:  Alert and awake   Hydration Status:  Euvolemic and stable   PONV Controlled:  Controlled   Airway Patency:  Patent and adequate      Post Op Vitals Reviewed: Yes      Staff: CRNA         No notable events documented.     BP  150/70    Temp      Pulse 66   Resp 23   SpO2 99% 2L nc

## 2023-08-07 NOTE — ASSESSMENT & PLAN NOTE
- acute blood loss secondary to femur fracture  - Hgb drop from 10.3 on admission to 8.5 today   - Expect additional decrease with operative losses today  - Hemodynamically stable  - Continue to monitor

## 2023-08-07 NOTE — PHYSICAL THERAPY NOTE
Physical Therapy Cancellation Note         08/07/23 0744   Note Type   Note type Cancelled Session   Cancel Reasons Patient to operating room   Additional Comments PT orders received. Chart reviewed. Pt to OR for fixation of left femur fracture. Will hold PT eval and follow up post operatively. Please place post operative WBS.      Shraddha Hope, PT

## 2023-08-07 NOTE — OP NOTE
OPERATIVE REPORT  PATIENT NAME: Tiana Leos    :  1920  MRN: [id number]  Pt Location: AN OR ROOM 01    SURGERY DATE: 2023    Surgeon(s) and Role:     * Jeremy Ojeda, DO - Primary      * Willy Merida MD - Assisting   I was present for the entire procedure. and I was present for all critical portions of the procedure. Preop Diagnosis:  #1 left peritrochanteric femur fracture    Post-Op Diagnosis:  #1 left peritrochanteric femur fracture    Procedures:  #1 surgical fixation of left peritrochanteric femur fracture with an intramedullary nail      Specimen(s):  None    Estimated Blood Loss:   25 cc    Drains:  None    Anesthesia Type:   General endotracheal    Operative Indications:  Patient is a 8-year-old female that normally ambulated minimally with a walker that was trying to get up to her walker when she had a twist and a fall onto the left side. She had immediate pain and inability to weight-bear on the left lower extremity she was found to have a left peritrochanteric femur fracture. In order to restore ambulatory status, decrease pain, allow for appropriate hygiene the patient was consented for surgical fixation to avoid complications associated with a bedridden status      Implants:   Synthes 11 mm x 175 mm TFN a with a 90 mm helical blade    Tourniquet time:   None      Complications:   No acute complications were encountered. Patient was transferred to PACU in stable condition    Operative findings:  Patient had a left peritrochanteric femur fracture that was able to be closed reduced on the fracture top table. The fixation was able to be completed with an intramedullary nail and the cephalomedullary screw. The fracture was stable after surgical fixation. Procedure and Technique:  Patient is 8-year-old female that was seen and examined in preoperative holding area. The operative extremities marked. All patient's questions were answered.   Patient was then taken to the operating room and general endotracheal anesthesia was administered by department anesthesia. The patient was then transferred over the operating table in CTL spine precautions. All bony prominences were well-padded. Patient was then prepped and draped in a standard sterile orthopedic fashion. A timeout was performed confirm correct side, correct patient, correct procedure. All were in agreement procedure was started. Using the fracture top table closed reduction maneuvers were performed to to reduce the proximal femoral segment. Once this was confirmed under biplanar fluoroscopic imaging a 3 cm incision was made proximal to the greater trochanter with a #10 blade through skin subcutaneous tissues. Electrocautery was used to obtain pneumostasis. Blunt dissection was carried down through the gluteus medius with a curved Reeves scissor and the trochanteric region was palpated. A guidewire was then inserted into the appropriate starting position and with the appropriate entry ankle into the proximal femoral segment. An opening reamer was then used to gain access to the medullary canal after confirmation of guidewire positioning on biplanar fluoroscopic imaging. After which a Synthes 11 mm x 175 mm TFN nail was selected attached to the jig and inserted into the intramedullary canal.  Once appropriate seating of the nail was confirmed under biplanar fluoroscopic imaging a guidewire was placed to guide the cephalomedullary screw. A 90 mm helical blade was then selected attached to the  and malleted into place. Once confirmation of the appropriate tip to apex distance and appropriate subchondral placement of the helical blade was confirmed under multiplanar fluoroscopic imaging the setscrew was tightened and backed off a half a turn and compression was applied to the external jig. The setscrew was then retightened and backed off half a turn to allow for sliding.   Once this position of the screw was confirmed under biplanar fluoroscopic imaging using the external jig a diaphyseal screw was used to secure the distal aspect of the nail with a 5 mm interlocking screw. The jig was then removed and final reduction implant placement was confirmed under biplanar fluoroscopic imaging. The wounds were then copiously irrigated with sterile normal saline. The deep tissues were repaired using a 0 PDS suture. The subcutaneous tissues were repaired with a 2-0 Monocryl suture and the skin was closed with staples. The wounds were then dressed and Mepilex dressings. The patient was then taken off the operating room table using CTL S spine precautions extubated and transferred to PACU in stable condition        Postoperative plan:  Patient will be weightbearing as tolerated to left lower extremity. Patient will receive 24 hours of postoperative antibiotics for infection prophylaxis. Patient will be restarted on her Eliquis postop day #1 and continued for DVT prophylaxis. The patient will work with physical therapy to regain gait training.   The patient will need to follow-up in 2 weeks for repeat x-ray evaluation and removal of staples in 2 weeks    SIGNATURE: Kj Newby DO  DATE: August 7, 2023  TIME: 9:35 AM

## 2023-08-07 NOTE — PLAN OF CARE
Problem: MOBILITY - ADULT  Goal: Maintain or return to baseline ADL function  Description: INTERVENTIONS:  -  Assess patient's ability to carry out ADLs; assess patient's baseline for ADL function and identify physical deficits which impact ability to perform ADLs (bathing, care of mouth/teeth, toileting, grooming, dressing, etc.)  - Assess/evaluate cause of self-care deficits   - Assess range of motion  - Assess patient's mobility; develop plan if impaired  - Assess patient's need for assistive devices and provide as appropriate  - Encourage maximum independence but intervene and supervise when necessary  - Involve family in performance of ADLs  - Assess for home care needs following discharge   - Consider OT consult to assist with ADL evaluation and planning for discharge  - Provide patient education as appropriate  Outcome: Progressing  Goal: Maintains/Returns to pre admission functional level  Description: INTERVENTIONS:  - Perform BMAT or MOVE assessment daily.   - Set and communicate daily mobility goal to care team and patient/family/caregiver. - Collaborate with rehabilitation services on mobility goals if consulted  - Perform Range of Motion  times a day. - Reposition patient every hours.   - Dangle patient  times a day  - Stand patient  times a day  - Ambulate patient  times a day  - Out of bed to chair  times a day   - Out of bed for meals  times a day  - Out of bed for toileting  - Record patient progress and toleration of activity level   Outcome: Progressing     Problem: PAIN - ADULT  Goal: Verbalizes/displays adequate comfort level or baseline comfort level  Description: Interventions:  - Encourage patient to monitor pain and request assistance  - Assess pain using appropriate pain scale  - Administer analgesics based on type and severity of pain and evaluate response  - Implement non-pharmacological measures as appropriate and evaluate response  - Consider cultural and social influences on pain and pain management  - Notify physician/advanced practitioner if interventions unsuccessful or patient reports new pain  Outcome: Progressing     Problem: INFECTION - ADULT  Goal: Absence or prevention of progression during hospitalization  Description: INTERVENTIONS:  - Assess and monitor for signs and symptoms of infection  - Monitor lab/diagnostic results  - Monitor all insertion sites, i.e. indwelling lines, tubes, and drains  - Monitor endotracheal if appropriate and nasal secretions for changes in amount and color  - [place] appropriate cooling/warming therapies per order  - Administer medications as ordered  - Instruct and encourage patient and family to use good hand hygiene technique  - Identify and instruct in appropriate isolation precautions for identified infection/condition  Outcome: Progressing  Goal: Absence of fever/infection during neutropenic period  Description: INTERVENTIONS:  - Monitor WBC    Outcome: Progressing     Problem: SAFETY ADULT  Goal: Maintain or return to baseline ADL function  Description: INTERVENTIONS:  -  Assess patient's ability to carry out ADLs; assess patient's baseline for ADL function and identify physical deficits which impact ability to perform ADLs (bathing, care of mouth/teeth, toileting, grooming, dressing, etc.)  - Assess/evaluate cause of self-care deficits   - Assess range of motion  - Assess patient's mobility; develop plan if impaired  - Assess patient's need for assistive devices and provide as appropriate  - Encourage maximum independence but intervene and supervise when necessary  - Involve family in performance of ADLs  - Assess for home care needs following discharge   - Consider OT consult to assist with ADL evaluation and planning for discharge  - Provide patient education as appropriate  8/7/2023 1121 by Richard Hobbs RN  Outcome: Progressing  8/7/2023 1121 by Richard Hobbs RN  Outcome: Progressing  Goal: Maintains/Returns to pre admission functional level  Description: INTERVENTIONS:  - Perform BMAT or MOVE assessment daily.   - Set and communicate daily mobility goal to care team and patient/family/caregiver. - Collaborate with rehabilitation services on mobility goals if consulted  - Perform Range of Motion  times a day. - Reposition patient every  hours. - Dangle patient  times a day  - Stand patient  times a day  - Ambulate patient  times a day  - Out of bed to chair  times a day   - Out of bed for meals  times a day  - Out of bed for toileting  - Record patient progress and toleration of activity level   8/7/2023 1121 by Trudy Mckeon RN  Outcome: Progressing  8/7/2023 1121 by Trudy Mckeon RN  Outcome: Progressing  Goal: Patient will remain free of falls  Description: INTERVENTIONS:  - Educate patient/family on patient safety including physical limitations  - Instruct patient to call for assistance with activity   - Consult OT/PT to assist with strengthening/mobility   - Keep Call bell within reach  - Keep bed low and locked with side rails adjusted as appropriate  - Keep care items and personal belongings within reach  - Initiate and maintain comfort rounds  - Make Fall Risk Sign visible to staff  - Offer Toileting every  Hours, in advance of need  - Initiate/Maintain alarm  - Obtain necessary fall risk management equipment:   - Apply yellow socks and bracelet for high fall risk patients  - Consider moving patient to room near nurses station  Outcome: Progressing     Problem: Knowledge Deficit  Goal: Patient/family/caregiver demonstrates understanding of disease process, treatment plan, medications, and discharge instructions  Description: Complete learning assessment and assess knowledge base.   Interventions:  - Provide teaching at level of understanding  - Provide teaching via preferred learning methods  Outcome: Progressing

## 2023-08-07 NOTE — ASSESSMENT & PLAN NOTE
- per patient's POA, she was diagnosed with acute PE within the last couple of months at [facility]. She has been on Eliquis and 4 L oxygen NC since that time.    - she follows now with her PCP for this  - heparin infusion while off of Eliquis   - On hold since 3am  - Hold Eliquis for hip surgery for now  - continue supplemental oxygen

## 2023-08-07 NOTE — DISCHARGE INSTR - AVS FIRST PAGE
Discharge Instructions - Orthopedics  Smitha Siegel 80 y.o. female MRN: [id number]  Unit/Bed#: AN OR MAIN    Weight Bearing Status:                                           Weight bearing as tolerated left lower extremity    DVT prophylaxis:  Continue eliquis as prescribed    Pain:  Continue analgesics as directed    Dressing Instructions:   Please keep clean, dry and intact until follow up     Appt Instructions: If you do not have your appointment, please call the clinic at [phone]  Otherwise follow up as scheduled. Contact the office sooner if you experience any increased numbness/tingling in the extremities.

## 2023-08-07 NOTE — ANESTHESIA PREPROCEDURE EVALUATION
Procedure:  INSERTION NAIL IM FEMUR ANTEGRADE (TROCHANTERIC) (Right: Leg Upper)    Relevant Problems   CARDIO   (+) Hypertension      HEMATOLOGY   (+) Acute blood loss anemia        Physical Exam    Airway    Mallampati score: II  TM Distance: <3 FB  Neck ROM: full     Dental       Cardiovascular      Pulmonary      Other Findings  Poor dentition, multiple missing, none loose      Anesthesia Plan  ASA Score- 3     Anesthesia Type- general with ASA Monitors. Additional Monitors:   Airway Plan: ETT. Plan Factors-Exercise tolerance (METS): >4 METS. Chart reviewed. Existing labs reviewed. Patient summary reviewed. Patient is not a current smoker. There is medical exclusion for perioperative obstructive sleep apnea risk education. Induction- intravenous. Postoperative Plan- Plan for postoperative opioid use. Informed Consent- Anesthetic plan and risks discussed with patient. I personally reviewed this patient with the CRNA. Discussed and agreed on the Anesthesia Plan with the CRNA. Glynn Oliver

## 2023-08-07 NOTE — PROGRESS NOTES
[address] [facility]  Progress Note  Name: Salome Valentin I  MRN: [id number]  Unit/Bed#: S -01 I Date of Admission: 8/6/2023   Date of Service: 8/7/2023 I Hospital Day: 1    Assessment/Plan   Fall  Assessment & Plan  - mechanical fall   - sustained below stated injuries  - PT/OT evaluations  - Geriatrics evaluation pending  -  for dispo planning    * Fracture of left femur ([address])  Assessment & Plan  - Left femoral fracture, present on admission.  - Orthopedic surgery evaluation pending - plan for IM nail today   - NPO   - Heparin gtt on hold since 3am  - TXA given  - Maintain non-weightbearing status on the left extremity.   - Monitor left lower extremity neurovascular exam.  - Continue multimodal analgesic regimen.  - Continue DVT prophylaxis. Hold Eliquis. - PT and OT evaluation and treatment as indicated. - Outpatient follow up with Orthopedic surgery for re-evaluation. Pulmonary embolism [facility])  Assessment & Plan  - per patient's POA, she was diagnosed with acute PE within the last couple of months at [facility]. She has been on Eliquis and 4 L oxygen NC since that time. - she follows now with her PCP for this  - heparin infusion while off of Eliquis   - On hold since 3am  - Hold Eliquis for hip surgery for now  - continue supplemental oxygen    Acute blood loss anemia  Assessment & Plan  - acute blood loss secondary to femur fracture  - Hgb drop from 10.3 on admission to 8.5 today   - Expect additional decrease with operative losses today  - Hemodynamically stable  - Continue to monitor    Hypertension  Assessment & Plan  - history of HTN  - continue home Toprol XL and amlodipine. Will hold lisinopril in the perioperative setting. Acute pain due to trauma  Assessment & Plan  - Acute pain secondary to traumatic injuries. - Appreciate APS evaluation and recommendations.   - Bowel regimen as long as using opioids.  - Continue to monitor pain and adjust regimen as indicated. TRAUMA TERTIARY SURVEY NOTE    VTE Prophylaxis:Heparin     Disposition: Continue med/surg status with operative repair of femur fracture today    Code status:  Level 1 - Full Code    Consultants: IP CONSULT TO ORTHOPEDIC SURGERY  IP CONSULT TO CASE MANAGEMENT  IP CONSULT TO GERONTOLOGY    Subjective   Transfer from: Not a transfer    Mechanism of Injury:Fall     Chief Complaint: no complaints    HPI/Last 24 hour events: Patient reports she feels good, she denies pain. Objective   Vitals:   Temp:  [97.8 °F (36.6 °C)-98.8 °F (37.1 °C)] 98.8 °F (37.1 °C)  HR:  [64-91] 64  Resp:  [16] 16  BP: (121-179)/(72-93) 138/81    I/O       08/05 0701  08/06 0700 08/06 0701  08/07 0700    I.V. (mL/kg)  60 (1.4)    IV Piggyback  100    Total Intake(mL/kg)  160 (3.7)    Net  +160                 Physical Exam:   GENERAL APPEARANCE: No acute distress, resting supine in bed  NEURO: AAOX3, GCS 15, no focal neuro deficit  HEENT: PERRL EOMI mucus membranes moist  CV: RRR S1 S2 without murmur or gallop  LUNGS: Clear to ausc bilaterally without wheezes, crackles or rhonchi, non labored breathing on 4L NC  GI: Soft, nontender nondistended   : voiding independently  MSK: LLE DVNI compartments soft and compressible   SKIN: warm, dry, intact    Invasive Devices     Peripheral Intravenous Line  Duration           Peripheral IV 08/06/23 Left;Upper;Ventral (anterior) Arm <1 day                   1. Before the illness or injury that brought you to the Emergency, did you need someone to help you on a regular basis? 1=Yes   2. Since the illness or injury that brought you to the Emergency, have you needed more help than usual to take care of yourself? 1=Yes   3. Have you been hospitalized for one or more nights during the past 6 months (excluding a stay in the Emergency Department)? 0=No   4. In general, do you see well? 0=Yes   5. In general, do you have serious problems with your memory? 0=No   6.  Do you take more than three different medications everyday?  1=Yes   TOTAL   4     Did you order a geriatric consult if the score was 2 or greater?: yes         Lab Results:   BMP/CMP:   Lab Results   Component Value Date    SODIUM 141 08/07/2023    K 4.0 08/07/2023     08/07/2023    CO2 35 (H) 08/07/2023    CO2 37 (H) 08/06/2023    BUN 25 08/07/2023    CREATININE 1.02 08/07/2023    GLUCOSE 101 08/06/2023    CALCIUM 8.4 08/07/2023    EGFR 44 08/07/2023    and CBC:   Lab Results   Component Value Date    WBC 8.79 08/07/2023    HGB 8.5 (L) 08/07/2023    HCT 28.5 (L) 08/07/2023    MCV 98 08/07/2023     08/07/2023    RBC 2.90 (L) 08/07/2023    MCH 29.3 08/07/2023    MCHC 29.8 (L) 08/07/2023    RDW 18.7 (H) 08/07/2023    MPV 11.1 08/07/2023    NRBC 0 08/07/2023       Imaging Results: I have personally reviewed pertinent films in PACS  Chest Xray(s): negative for acute findings   FAST exam(s): negative for acute findings   CT Scan(s): negative for acute findings   Additional Xray(s): positive for acute findings: Left intertrochanteric femur fracture     Other Studies: none

## 2023-08-08 PROBLEM — H91.90 HARD OF HEARING: Status: ACTIVE | Noted: 2023-08-08

## 2023-08-08 PROBLEM — G31.84 MILD COGNITIVE IMPAIRMENT: Status: ACTIVE | Noted: 2023-08-08

## 2023-08-08 LAB
ABO GROUP BLD BPU: NORMAL
ABO GROUP BLD BPU: NORMAL
ANION GAP SERPL CALCULATED.3IONS-SCNC: 10 MMOL/L
ANION GAP SERPL CALCULATED.3IONS-SCNC: 10 MMOL/L
APTT PPP: 62 SECONDS (ref 23–37)
APTT PPP: 62 SECONDS (ref 23–37)
BASOPHILS # BLD AUTO: 0.02 THOUSANDS/ÂΜL (ref 0–0.1)
BASOPHILS # BLD AUTO: 0.02 THOUSANDS/ÂΜL (ref 0–0.1)
BASOPHILS NFR BLD AUTO: 0 % (ref 0–1)
BASOPHILS NFR BLD AUTO: 0 % (ref 0–1)
BPU ID: NORMAL
BPU ID: NORMAL
BUN SERPL-MCNC: 36 MG/DL (ref 5–25)
BUN SERPL-MCNC: 36 MG/DL (ref 5–25)
CALCIUM SERPL-MCNC: 8.4 MG/DL (ref 8.4–10.2)
CALCIUM SERPL-MCNC: 8.4 MG/DL (ref 8.4–10.2)
CHLORIDE SERPL-SCNC: 99 MMOL/L (ref 96–108)
CHLORIDE SERPL-SCNC: 99 MMOL/L (ref 96–108)
CO2 SERPL-SCNC: 29 MMOL/L (ref 21–32)
CO2 SERPL-SCNC: 29 MMOL/L (ref 21–32)
CREAT SERPL-MCNC: 1.28 MG/DL (ref 0.6–1.3)
CREAT SERPL-MCNC: 1.28 MG/DL (ref 0.6–1.3)
CROSSMATCH: NORMAL
CROSSMATCH: NORMAL
EOSINOPHIL # BLD AUTO: 0 THOUSAND/ÂΜL (ref 0–0.61)
EOSINOPHIL # BLD AUTO: 0 THOUSAND/ÂΜL (ref 0–0.61)
EOSINOPHIL NFR BLD AUTO: 0 % (ref 0–6)
EOSINOPHIL NFR BLD AUTO: 0 % (ref 0–6)
ERYTHROCYTE [DISTWIDTH] IN BLOOD BY AUTOMATED COUNT: 18.4 % (ref 11.6–15.1)
ERYTHROCYTE [DISTWIDTH] IN BLOOD BY AUTOMATED COUNT: 18.4 % (ref 11.6–15.1)
GFR SERPL CREATININE-BSD FRML MDRD: 33 ML/MIN/1.73SQ M
GFR SERPL CREATININE-BSD FRML MDRD: 33 ML/MIN/1.73SQ M
GLUCOSE SERPL-MCNC: 127 MG/DL (ref 65–140)
GLUCOSE SERPL-MCNC: 127 MG/DL (ref 65–140)
HCT VFR BLD AUTO: 21.7 % (ref 34.8–46.1)
HCT VFR BLD AUTO: 21.7 % (ref 34.8–46.1)
HCT VFR BLD AUTO: 26.5 % (ref 34.8–46.1)
HCT VFR BLD AUTO: 26.5 % (ref 34.8–46.1)
HGB BLD-MCNC: 6.4 G/DL (ref 11.5–15.4)
HGB BLD-MCNC: 6.4 G/DL (ref 11.5–15.4)
HGB BLD-MCNC: 8.2 G/DL (ref 11.5–15.4)
HGB BLD-MCNC: 8.2 G/DL (ref 11.5–15.4)
IMM GRANULOCYTES # BLD AUTO: 0.06 THOUSAND/UL (ref 0–0.2)
IMM GRANULOCYTES # BLD AUTO: 0.06 THOUSAND/UL (ref 0–0.2)
IMM GRANULOCYTES NFR BLD AUTO: 1 % (ref 0–2)
IMM GRANULOCYTES NFR BLD AUTO: 1 % (ref 0–2)
INR PPP: 0.98 (ref 0.84–1.19)
INR PPP: 0.98 (ref 0.84–1.19)
LYMPHOCYTES # BLD AUTO: 1.8 THOUSANDS/ÂΜL (ref 0.6–4.47)
LYMPHOCYTES # BLD AUTO: 1.8 THOUSANDS/ÂΜL (ref 0.6–4.47)
LYMPHOCYTES NFR BLD AUTO: 15 % (ref 14–44)
LYMPHOCYTES NFR BLD AUTO: 15 % (ref 14–44)
MCH RBC QN AUTO: 29.8 PG (ref 26.8–34.3)
MCH RBC QN AUTO: 29.8 PG (ref 26.8–34.3)
MCHC RBC AUTO-ENTMCNC: 30.9 G/DL (ref 31.4–37.4)
MCHC RBC AUTO-ENTMCNC: 30.9 G/DL (ref 31.4–37.4)
MCV RBC AUTO: 96 FL (ref 82–98)
MCV RBC AUTO: 96 FL (ref 82–98)
MONOCYTES # BLD AUTO: 1.04 THOUSAND/ÂΜL (ref 0.17–1.22)
MONOCYTES # BLD AUTO: 1.04 THOUSAND/ÂΜL (ref 0.17–1.22)
MONOCYTES NFR BLD AUTO: 9 % (ref 4–12)
MONOCYTES NFR BLD AUTO: 9 % (ref 4–12)
NEUTROPHILS # BLD AUTO: 8.81 THOUSANDS/ÂΜL (ref 1.85–7.62)
NEUTROPHILS # BLD AUTO: 8.81 THOUSANDS/ÂΜL (ref 1.85–7.62)
NEUTS SEG NFR BLD AUTO: 75 % (ref 43–75)
NEUTS SEG NFR BLD AUTO: 75 % (ref 43–75)
NRBC BLD AUTO-RTO: 0 /100 WBCS
NRBC BLD AUTO-RTO: 0 /100 WBCS
PLATELET # BLD AUTO: 211 THOUSANDS/UL (ref 149–390)
PLATELET # BLD AUTO: 211 THOUSANDS/UL (ref 149–390)
PMV BLD AUTO: 11.5 FL (ref 8.9–12.7)
PMV BLD AUTO: 11.5 FL (ref 8.9–12.7)
POTASSIUM SERPL-SCNC: 4.3 MMOL/L (ref 3.5–5.3)
POTASSIUM SERPL-SCNC: 4.3 MMOL/L (ref 3.5–5.3)
PROTHROMBIN TIME: 13.2 SECONDS (ref 11.6–14.5)
PROTHROMBIN TIME: 13.2 SECONDS (ref 11.6–14.5)
RBC # BLD AUTO: 2.75 MILLION/UL (ref 3.81–5.12)
RBC # BLD AUTO: 2.75 MILLION/UL (ref 3.81–5.12)
SODIUM SERPL-SCNC: 138 MMOL/L (ref 135–147)
SODIUM SERPL-SCNC: 138 MMOL/L (ref 135–147)
UNIT DISPENSE STATUS: NORMAL
UNIT DISPENSE STATUS: NORMAL
UNIT PRODUCT CODE: NORMAL
UNIT PRODUCT CODE: NORMAL
UNIT PRODUCT VOLUME: 350 ML
UNIT PRODUCT VOLUME: 350 ML
UNIT RH: NORMAL
UNIT RH: NORMAL
WBC # BLD AUTO: 11.73 THOUSAND/UL (ref 4.31–10.16)
WBC # BLD AUTO: 11.73 THOUSAND/UL (ref 4.31–10.16)

## 2023-08-08 PROCEDURE — 80048 BASIC METABOLIC PNL TOTAL CA: CPT | Performed by: ORTHOPAEDIC SURGERY

## 2023-08-08 PROCEDURE — 85730 THROMBOPLASTIN TIME PARTIAL: CPT | Performed by: SURGERY

## 2023-08-08 PROCEDURE — 85014 HEMATOCRIT: CPT

## 2023-08-08 PROCEDURE — 97163 PT EVAL HIGH COMPLEX 45 MIN: CPT

## 2023-08-08 PROCEDURE — 99232 SBSQ HOSP IP/OBS MODERATE 35: CPT | Performed by: FAMILY MEDICINE

## 2023-08-08 PROCEDURE — P9016 RBC LEUKOCYTES REDUCED: HCPCS

## 2023-08-08 PROCEDURE — 99233 SBSQ HOSP IP/OBS HIGH 50: CPT | Performed by: SURGERY

## 2023-08-08 PROCEDURE — 85018 HEMOGLOBIN: CPT

## 2023-08-08 PROCEDURE — 85610 PROTHROMBIN TIME: CPT

## 2023-08-08 PROCEDURE — 85025 COMPLETE CBC W/AUTO DIFF WBC: CPT | Performed by: ORTHOPAEDIC SURGERY

## 2023-08-08 PROCEDURE — 97167 OT EVAL HIGH COMPLEX 60 MIN: CPT

## 2023-08-08 PROCEDURE — 99024 POSTOP FOLLOW-UP VISIT: CPT | Performed by: PHYSICIAN ASSISTANT

## 2023-08-08 RX ADMIN — CEFAZOLIN SODIUM 2000 MG: 2 SOLUTION INTRAVENOUS at 00:08

## 2023-08-08 RX ADMIN — Medication 2000 UNITS: at 08:55

## 2023-08-08 RX ADMIN — ACETAMINOPHEN 975 MG: 325 TABLET, FILM COATED ORAL at 21:28

## 2023-08-08 RX ADMIN — METOPROLOL SUCCINATE 25 MG: 25 TABLET, EXTENDED RELEASE ORAL at 21:28

## 2023-08-08 RX ADMIN — AMLODIPINE BESYLATE 5 MG: 5 TABLET ORAL at 08:55

## 2023-08-08 RX ADMIN — HEPARIN SODIUM 18 UNITS/KG/HR: 10000 INJECTION, SOLUTION INTRAVENOUS at 13:48

## 2023-08-08 RX ADMIN — ISOSORBIDE MONONITRATE 30 MG: 30 TABLET, EXTENDED RELEASE ORAL at 08:55

## 2023-08-08 RX ADMIN — FOLIC ACID 1 MG: 1 TABLET ORAL at 08:55

## 2023-08-08 RX ADMIN — ACETAMINOPHEN 975 MG: 325 TABLET, FILM COATED ORAL at 13:48

## 2023-08-08 RX ADMIN — ACETAMINOPHEN 975 MG: 325 TABLET, FILM COATED ORAL at 05:58

## 2023-08-08 RX ADMIN — GABAPENTIN 100 MG: 100 CAPSULE ORAL at 21:28

## 2023-08-08 NOTE — PROGRESS NOTES
[facility]  Progress Note  Name: Boston Hsieh I  MRN: [id number]  Unit/Bed#: S -01 I Date of Admission: 8/6/2023   Date of Service: 8/8/2023 I Hospital Day: 2    Assessment/Plan   Acute blood loss anemia  Assessment & Plan  - acute blood loss secondary to femur fracture  - Hgb drop from 10.3 on admission to 8.5 today   - Expect additional decrease with operative losses today  - Hemodynamically stable  - Continue to monitor    Hypertension  Assessment & Plan  - history of HTN  - continue home Toprol XL and amlodipine. Will hold lisinopril in the perioperative setting. Pulmonary embolism [facility])  Assessment & Plan  - per patient's POA, she was diagnosed with acute PE within the last couple of months at [facility]. She has been on Eliquis and 4 L oxygen NC since that time. - she follows now with her PCP for this  - heparin infusion while off of Eliquis   - On hold since 3am  - Hold Eliquis for hip surgery for now  - continue supplemental oxygen    Acute pain due to trauma  Assessment & Plan  - Acute pain secondary to traumatic injuries. - Appreciate APS evaluation and recommendations. - Bowel regimen as long as using opioids.  - Continue to monitor pain and adjust regimen as indicated. Fall  Assessment & Plan  - mechanical fall   - sustained below stated injuries  - PT/OT evaluations  - Geriatrics evaluation pending  -  for dispo planning    * Fracture of left femur ([address])  Assessment & Plan  - Left femoral fracture, present on admission.  - Orthopedic surgery evaluation pending - plan for IM nail today   - NPO   - Heparin gtt on hold since 3am  - TXA given  - Maintain non-weightbearing status on the left extremity.   - Monitor left lower extremity neurovascular exam.  - Continue multimodal analgesic regimen.  - Continue DVT prophylaxis. Hold Eliquis. - PT and OT evaluation and treatment as indicated.   - Outpatient follow up with Orthopedic surgery for re-evaluation. Disposition: Patient pending repeat evaluation orthopedic recommendations given the patient's left femur fracture fixation. Patient is also receiving blood transfusion for loss anemia following operative fixation. PT/OT eval for potential rehab placement      SUBJECTIVE:  Chief Complaint: "I feel great"    Subjective: Patient is very pleasant and conversational this morning. Patient states that she is feeling well and the pain with regards to her hip has been tolerable with medications administered. She denies any dizziness, lightheadedness, chest pain, shortness of breath. Postop check for the patient appeared to be consistent with this patient's evaluation this morning. It was also noted that following her procedure, her blood hemoglobin continued to downtrend with a most recent value of 6.4. Patient had been downtrending from 8-7 on the previous 2 laboratory evaluations. Given the decrease in hemoglobin in the setting of operative fixation, patient was counseled on the utilization of blood transfusion and after risk-benefit discussion at the bedside, current blood consent was signed for the patient for administration of a unit of blood. Transfusion was initiated at the bedside and anticipated plan of recheck of hemoglobin and hematocrit 30 minutes following blood transfusion.       Meds/Allergies   Medications Prior to Admission   Medication Sig Dispense Refill Last Dose   • amLODIPine (NORVASC) 10 mg tablet Take 5 mg by mouth daily      • apixaban (Eliquis) 5 mg Take 5 mg by mouth 2 (two) times a day      • aspirin 81 mg chewable tablet Chew 81 mg daily      • cholecalciferol (VITAMIN D3) 1,000 units tablet Take 2,000 Units by mouth daily      • folic acid (FOLVITE) 1 mg tablet Take 1 mg by mouth daily      • isosorbide mononitrate (IMDUR) 30 mg 24 hr tablet Take 30 mg by mouth daily      • lisinopril (ZESTRIL) 10 mg tablet Take 10 mg by mouth 2 (two) times a day      • metoprolol succinate (TOPROL-XL) 25 mg 24 hr tablet Take 25 mg by mouth daily at bedtime          Current Facility-Administered Medications:   •  acetaminophen (TYLENOL) tablet 975 mg, 975 mg, Oral, Q8H [facility], Sukhi Alfaro MD, 975 mg at 08/07/23 2241  •  amLODIPine (NORVASC) tablet 5 mg, 5 mg, Oral, Daily, Sukhi Alfaro MD, 5 mg at 08/07/23 6021  •  cholecalciferol (VITAMIN D3) tablet 2,000 Units, 2,000 Units, Oral, Daily, Sukhi Alfaro MD, 2,000 Units at 52/50/04 3349  •  folic acid (FOLVITE) tablet 1 mg, 1 mg, Oral, Daily, Sukhi Alfaro MD, 1 mg at 08/07/23 7061  •  gabapentin (NEURONTIN) capsule 100 mg, 100 mg, Oral, HS, Sukhi Alfaro MD, 100 mg at 08/07/23 2240  •  heparin (porcine) 25,000 units in 0.45% NaCl 250 mL infusion (premix), 3-30 Units/kg/hr (Order-Specific), Intravenous, Titrated, Vinicius Abebe MD, Last Rate: 7.2 mL/hr at 08/07/23 2244, 18 Units/kg/hr at 08/07/23 2244  •  HYDROmorphone HCl (DILAUDID) injection 0.2 mg, 0.2 mg, Intravenous, Q4H PRN, Sukhi Alfaro MD  •  isosorbide mononitrate (IMDUR) 24 hr tablet 30 mg, 30 mg, Oral, Daily, Sukhi Alfaro MD, 30 mg at 08/07/23 9029  •  lactated ringers bolus 1,000 mL, 1,000 mL, Intravenous, Once PRN **AND** lactated ringers bolus 1,000 mL, 1,000 mL, Intravenous, Once PRN, Sukhi Alfaro MD  •  metoprolol succinate (TOPROL-XL) 24 hr tablet 25 mg, 25 mg, Oral, HS, Sukhi Alfaro MD, 25 mg at 08/06/23 2106  •  ondansetron [facility]) injection 4 mg, 4 mg, Intravenous, Q4H PRN, Sukhi Alfaro MD  •  oxyCODONE (ROXICODONE) IR tablet 5 mg, 5 mg, Oral, Q4H PRN, Sukhi Alfaro MD, 5 mg at 08/06/23 1737  •  oxyCODONE (ROXICODONE) split tablet 2.5 mg, 2.5 mg, Oral, Q4H PRN, Sukhi Alfaro MD  •  polyethylene glycol (MIRALAX) packet 17 g, 17 g, Oral, Daily, Sukhi Alfaro MD  •  sodium chloride 0.9 % bolus 1,000 mL, 1,000 mL, Intravenous, Once PRN **AND** sodium chloride 0.9 % bolus 1,000 mL, 1,000 mL, Intravenous, Once PRN, Sukhi Alfaro MD  • Tranexamic Acid-NaCl (CYKLOKAPRON) 1000-0.7 MG/100ML-% injection 1,000 mg, 1,000 mg, Intravenous, On Call To OR, David Muse MD    OBJECTIVE:     Vitals: Blood pressure 108/51, pulse 61, temperature 98.8 °F (37.1 °C), resp. rate 16, height 4' 3" (1.295 m), weight 43 kg (94 lb 12.8 oz), SpO2 100 %. Body mass index is 25.62 kg/m². SpO2: SpO2: 100 %, SpO2 Activity: SpO2 Activity: At Rest, SpO2 Device: O2 Device: Nasal cannula, Capnography:    Temp (24hrs), Av.4 °F (36.9 °C), Min:97.3 °F (36.3 °C), Max:99.5 °F (37.5 °C)  Current: Temperature: 98.8 °F (37.1 °C)    ABG: No results found for: "PHART", "AYW2GKY", "PO2ART", "IAH3JON", "H1SCCBEL", "BEART", "SOURCE"      Intake/Output Summary (Last 24 hours) at 2023 0523  Last data filed at 2023 5620  Gross per 24 hour   Intake 550 ml   Output 10 ml   Net 540 ml       Invasive Devices     Peripheral Intravenous Line  Duration           Peripheral IV 23 Right;Ventral (anterior) Forearm <1 day    Peripheral IV 23 Right;Upper;Ventral (anterior) Arm <1 day                          Nutrition/GI Proph/Bowel Reg: Diet as tolerated, MiraLAX    Physical Exam:     GENERAL APPEARANCE: Patient in no acute distress. HEENT: NCAT; PERRL, EOMs intact; Mucous membranes moist  NECK / BACK: No tenderness  CV: Regular rate and rhythm; + S1, S2; no murmur/gallops/rubs appreciated. CHEST / LUNGS: Clear to auscultation; no wheezes/rales/rhonci. ABD: NABS; soft; non-distended; non-tender. EXT: +2 pulses bilaterally upper & lower extremities; no clubbing/cyanosis/edema. NEURO: GCS 15; no focal neurologic deficits; neurovascularly intact. SKIN: Warm, dry and well perfused; no rash; no jaundice.     Lab Results:   Results: I have personally reviewed all pertinent laboratory/tests results, BMP/CMP: No results found for: "SODIUM", "K", "CL", "CO2", "ANIONGAP", "BUN", "CREATININE", "GLUCOSE", "CALCIUM", "AST", "ALT", "ALKPHOS", "PROT", "BILITOT", "EGFR", CBC:   Lab Results Component Value Date    WBC 10.28 (H) 2023    HGB 6.4 (LL) 2023    HCT 21.7 (L) 2023    MCV 99 (H) 2023     2023    RBC 2.46 (L) 2023    MCH 30.1 2023    MCHC 30.3 (L) 2023    RDW 18.8 (H) 2023    MPV 11.6 2023   , Coagulation:   Lab Results   Component Value Date    INR 1.05 2023   , Lactate: No results found for: "LACTATE", Amylase: No results found for: "AMYLASE", Lipase: No results found for: "LIPASE", AST: No results found for: "AST", ALT: No results found for: "ALT", Urinalysis: No results found for: "COLORU", "CLARITYU", "SPECGRAV", "PHUR", "LEUKOCYTESUR", "NITRITE", "PROTEINUA", "GLUCOSEU", "[place]", "[place] Abreu", "BLOODU", CK: No results found for: "CKTOTAL", Troponin: No results found for: "TROPONINI", EtOH: No results found for: "ETOH", UDS: No components found for: "RAPIDDRUGSCREEN", Pregnancy: No results found for: "PREGTESTUR", ABG: No results found for: "PHART", "CTC4CCT", "PO2ART", "XWX0SMQ", "F7BAORZM", "BEART", "SOURCE" and ISTAT: No components found for: "VBG"  Imaging/EKG Studies: N/A  Other Studies: No new studies to review at this time  VTE Prophylaxis: Heparin    Jose Leo MD  2023

## 2023-08-08 NOTE — PLAN OF CARE
Problem: PHYSICAL THERAPY ADULT  Goal: Performs mobility at highest level of function for planned discharge setting. See evaluation for individualized goals. Description: Treatment/Interventions: Functional transfer training, LE strengthening/ROM, Therapeutic exercise, Patient/family training, Equipment eval/education, Bed mobility, Gait training, Spoke to nursing, Spoke to case management, OT          See flowsheet documentation for full assessment, interventions and recommendations. Note: Prognosis: Fair  Problem List: Decreased strength, Decreased range of motion, Decreased endurance, Impaired balance, Decreased cognition, Impaired judgement, Decreased safety awareness, Decreased skin integrity, Orthopedic restrictions, Pain  Assessment: Pt is a 80 y.o. female seen for PT evaluation s/p admit to [address] [place] [address] on 8/6/2023. Pt was admitted with a primary dx of: fracture of left femur. Pt [facility] Pt is currently WBAT on L LE. Mallorie Quigley PT now consulted for assessment of mobility and d/c needs. Pt with Up in chair orders. Pts current comorbidities and personal factors effecting treatment include: CAD, HTN, PE. Pts current clinical presentation is Unstable/Unpredictable (high complexity) due to Ongoing medical management for primary dx, Decreased activity tolerance compared to baseline, Fall risk, Increased assistance needed from caregiver at current time, Cog status, Current WBS, s/p surgical intervention. Prior to admission, pt was required family assistance. Upon evaluation, pt currently is requiring MaxA for bed mobility; Vick for transfers and currently unable to ambulate 2/2 LE pain.  Pt presents at PT eval functioning below baseline and currently w/ overall mobility deficits 2* to: BLE weakness, impaired balance, impaired coordination, gait deviations, pain, decreased activity tolerance compared to baseline, decreased functional mobility tolerance compared to baseline, decreased safety awareness, impaired judgement, fall risk, decreased cognition. Pt currently at a fall risk 2* to impairments listed above. Pt will continue to benefit from skilled acute PT interventions to address stated impairments; to maximize functional mobility; for ongoing pt/ family training; and DME needs. At conclusion of PT session chair alarm engaged, all needs in reach, RN notified of session findings/recommendations and pt returned back in recliner chair with phone and call bell within reach. Pt denies any further questions at this time. Recommend IP rehab upon hospital D/C. PT Discharge Recommendation: Post acute rehabilitation services    See flowsheet documentation for full assessment.

## 2023-08-08 NOTE — ASSESSMENT & PLAN NOTE
Monitor orthostatic vital signs  Recommend discontinue amlodipine  Avoid hypotension  Encourage protein supplements and p.o. intake  Continue PT OT

## 2023-08-08 NOTE — CASE MANAGEMENT
Case Management Discharge Planning Note    Patient name Boston Hsieh  Location S /S -01 MRN [id number]  : 1920 Date 2023       Current Admission Date: 2023  Current Admission Diagnosis:Fracture of left femur [facility])   Patient Active Problem List    Diagnosis Date Noted   • Hard of hearing 2023   • Mild cognitive impairment 2023   • Acute blood loss anemia 2023   • Fracture of left femur ([address]) 2023   • Fall 2023   • Acute pain due to trauma 2023   • Pulmonary embolism ([address]) 2023   • Hypertension 2023      LOS (days): 2  Geometric Mean LOS (GMLOS) (days): 4.40  Days to GMLOS:2.2     OBJECTIVE:  Risk of Unplanned Readmission Score: 12.78         Current admission status: Inpatient   Preferred Pharmacy: No Pharmacies Listed  Primary Care Provider: STEFFANIE Crockett    Primary Insurance: MEDICARE  Secondary Insurance: BLUE CROSS    DISCHARGE DETAILS:    Discharge planning discussed with[de-identified] Karlene Breath - POA/Friend  Freedom of Choice: Yes  Comments - Freedom of Choice: CM discussed discharge plans per care team recommendations. Karlene Breath is in agreement with [facility] referrals but preference is [facility] - referrals sent. CM will f/u once determination is made.   CM contacted family/caregiver?: Yes  Were Treatment Team discharge recommendations reviewed with patient/caregiver?: Yes  Did patient/caregiver verbalize understanding of patient care needs?: N/A- going to facility  Were patient/caregiver advised of the risks associated with not following Treatment Team discharge recommendations?: Yes    Contacts  Patient Contacts: Karlene Rodrigez  Relationship to Patient[de-identified] Friend (POA)  Contact Method: Phone  Phone Number: [phone]  Reason/Outcome: Discharge Planning, Referral    Requested [address]         Is the patient interested in [facility] at discharge?: No    DME Referral Provided  Referral made for DME?: No    Other Referral/Resources/Interventions Provided:  Interventions: Short Term Rehab  Referral Comments: [facility] referrals made.          Treatment Team Recommendation: Short Term Rehab  Discharge Destination Plan[de-identified] Short Term Rehab

## 2023-08-08 NOTE — QUICK NOTE
Trauma Surgery   Post-Op Check Progress Note   Lana Perry 80 y.o. female MRN: [id number]  Unit/Bed#: S -01 Encounter: [id number]    Assessment and Plan:    8-year-old female with left peritrochanteric fracture status post surigical fixation of peritrochantric intramedullary nail.   - P.R.N. Analgesia. - Encouraged incentive spirometry use. Subjective/Objective     Subjective: Patient states she feel "great" and is appreciative that we came to check on her. Bandages clean dry and intact. Denies any pain at this time. Resting comfortably in bed before I awoke her. Objective:     Blood pressure 108/57, pulse 92, temperature 97.6 °F (36.4 °C), resp. rate 17, height 4' 3" (1.295 m), weight 43 kg (94 lb 12.8 oz), SpO2 (!) 88 %. ,Body mass index is 25.62 kg/m². Intake/Output Summary (Last 24 hours) at 8/8/2023 0146  Last data filed at 8/7/2023 0367  Gross per 24 hour   Intake 550 ml   Output 10 ml   Net 540 ml       Invasive Devices     Peripheral Intravenous Line  Duration           Peripheral IV 08/07/23 Right;Ventral (anterior) Forearm <1 day                Physical Exam:    GENERAL APPEARANCE: Patient in no acute distress. HEENT: NCAT; PERRL, EOMs intact; Mucous membranes moist  CV: Regular rate and rhythm; + S1, S2; no murmur/gallops/rubs appreciated. LUNGS: Clear to auscultation; no wheezes/rales/rhonci. ABD: NABS; soft; non-distended; non-tender. EXT: +2 pulses bilaterally upper & lower extremities; no clubbing/cyanosis/edema. NEURO: GCS 15; no focal neurologic deficits; neurovascularly intact. SKIN: Warm, dry and well perfused; no rash; no jaundice.                 Liseth De La Torre MD  8/8/2023 Chelsey Olivares

## 2023-08-08 NOTE — ASSESSMENT & PLAN NOTE
Patient is alert oriented x 2-3, knows the month but she was not able to tell me the date.   Needs assistance with IADLs and some of her ADLs  Supportive care and reorient as needed  Continue sleep-wake cycle  Manage pain as needed  Monitor for constipation and urinary retention  Avoid antihistaminics, anticholinergics, tramadol  Continue PT OT  Encourage family and friends to visit

## 2023-08-08 NOTE — PLAN OF CARE
Problem: OCCUPATIONAL THERAPY ADULT  Goal: Performs self-care activities at highest level of function for planned discharge setting. See evaluation for individualized goals. Description: Treatment Interventions: ADL retraining, Functional transfer training, Endurance training, Cognitive reorientation, Patient/family training, Equipment evaluation/education, Compensatory technique education, Continued evaluation, Energy conservation, Activityengagement  Equipment Recommended: Bedside commode       See flowsheet documentation for full assessment, interventions and recommendations. Note: Limitation: Decreased ADL status, Decreased Safe judgement during ADL, Decreased cognition, Decreased endurance, Decreased self-care trans, Decreased high-level ADLs (pain, balance, fxnl mobility, act walter, fxnl reach, standing walter, strength and fxnl sitting balance, safety awareness, insight, orientation and initiation, response time)  Prognosis: Good  Assessment: Pt is a 80 y.o. female seen for OT evaluation s/p admit to [address] [place] [address] on 8/6/2023 w/Fracture of left femur ([address] Prior to admission, pt was living alone in a 1 level house with 24/7 caregivers, (I) with ADLs, (A) with IADLs, (+) falls, (-) . Personal and environmental factors affecting patient at time of evaluation include advanced age, inaccessible home environment, difficulty completing ADLs and difficulty completing IADLs. Personal factors supporting patient at time of evaluation include (I) PLOF, supportive 24/7 caregiver  and FFSU. Based upon this evaluation, pt is functioning below baseline. Pt will benefit from continued skilled inpatient OT to maximize safety, level of independence overall performance in ADLs, functional transfers, functional mobility to return to functional baseline/highest level of function.      OT Discharge Recommendation: Post acute rehabilitation services (vs HHOT pending progress towards goals and availability of social support upon DC as pt currently requires A x 1 for ADLs/mobility)     CHRIS Parnell, OTR/L  [place] License [id number]  [address] [id number]

## 2023-08-08 NOTE — ASSESSMENT & PLAN NOTE
- per patient's POA, she was diagnosed with acute PE within the last couple of months at [facility]. She has been on Eliquis and 4 L oxygen NC since that time.    - she follows now with her PCP for this  - heparin infusion while off of Eliquis   - On hold since 3am  - Hold Eliquis for hip surgery for now  - continue supplemental oxygen

## 2023-08-08 NOTE — OCCUPATIONAL THERAPY NOTE
Occupational Therapy Evaluation     Patient Name: Colin Gibbs  [id number] Date: 8/8/2023  Problem List  Principal Problem:    Fracture of left femur [facility])  Active Problems:    Fall    Acute pain due to trauma    Pulmonary embolism ([address])    Hypertension    Acute blood loss anemia    Past Medical History  Past Medical History:   Diagnosis Date    CAD (coronary artery disease)     Hypertension     Pulmonary embolism ([address])      Past Surgical History  Past Surgical History:   Procedure Laterality Date    IN OPTX FEM SHFT FX W/INSJ IMED IMPLT W/WO SCREW Left 8/7/2023    Procedure: INSERTION NAIL IM FEMUR ANTEGRADE (TROCHANTERIC); Surgeon: Steven Sorto DO;  Location: [facility];  Service: Orthopedics         08/08/23 1006   OT Last Visit   OT Visit Date 08/08/23   Note Type   Note type Evaluation   Pain Assessment   Pain Assessment Tool FLACC   Pain Location/Orientation Orientation: Left; Location: Hip   Pain Onset/Description Frequency: Intermittent; Descriptor: Aching  (w/ movement)   Effect of Pain on Daily Activities limits activity tolerance, comfort, forward functional reach   Patient's Stated Pain Goal No pain   Hospital Pain Intervention(s) Repositioned; Ambulation/increased activity; Elevated; Emotional support   Pain Rating: FLACC (Rest) - Face 0   Pain Rating: FLACC (Rest) - Legs 0   Pain Rating: FLACC (Rest) - Activity 0   Pain Rating: FLACC (Rest) - Cry 0   Pain Rating: FLACC (Rest) - Consolability 0   Score: FLACC (Rest) 0   Pain Rating: FLACC (Activity) - Face 1   Pain Rating: FLACC (Activity) - Legs 0   Pain Rating: FLACC (Activity) - Activity 1   Pain Rating: FLACC (Activity) - Cry 1   Pain Rating: FLACC (Activity) - Consolability 1   Score: FLACC (Activity) 4   Restrictions/Precautions   Weight Bearing Precautions Per Order Yes   LLE Weight Bearing Per Order WBAT   Other Precautions Cognitive; Chair Alarm; Bed Alarm;WBS;Fall Risk;Pain;O2  (2L O2 NC)   Home Living   Type of [address] [place] [address] Dr One level;Performs ADLs on one level; Able to live on main level with bedroom/bathroom   Bathroom Equipment Shower chair   [address]   Additional Comments Pt poor historian, will confirm with CM   Prior Function   Level of [employer] Independent with ADLs; Independent with functional mobility; Independent with IADLS   Lives With Alone  (has 24/7 private caregivers)   [place] Help From Personal care attendant   IADLs Family/Friend/Other provides transportation; Family/Friend/Other provides meals; Family/Friend/Other provides medication management   Falls in the last 6 months 1 to 4  (1 per pt; reason for admission)   Vocational Retired  ("that was too long ago")   Comments Per CM note, pt is usually (I) with ADLs, RW for mobility. 24/7 caregivers assist with IADLs/transportation. Lifestyle   Autonomy Pt lives alone in a 1 level house, has 24/7 private caregivers that assist with IADLs, RW, (+) falls, (-)    Reciprocal Relationships Supportive caregivers   Service to Others Retired   General   Additional Pertinent History Pt admitted s/p fall at home resulting in L femur fx. POD # 1 IMN, WBAT per ortho   Family/Caregiver Present No   Subjective   Subjective "I think I still have to go"   ADL   Eating Assistance 5  Supervision/Setup   Eating Deficit Setup; Beverage management   Grooming Assistance 5  Supervision/Setup   UB Bathing Assistance 4  Minimal Assistance   LB Bathing Assistance 2  Maximal Assistance   LB Bathing Deficit Setup;Perineal area; Buttocks;Right upper leg;Left upper leg;Right lower leg including foot; Left lower leg including foot   UB Dressing Assistance 4  Minimal Assistance   UB Dressing Deficit Setup;Supervision/safety; Increased time to complete;Verbal cueing; Thread RUE; Thread LUE;Pull around back  (sitting in recliner, VC for orientation)   LB Dressing Assistance 2  Maximal Assistance   LB Dressing Deficit Don/doff R sock; Don/doff L sock  (sitting on [facility])   Toileting Assistance  2  Maximal Assistance   Toileting Deficit Setup;Steadying;Verbal cueing;Supervison/safety; Increased time to complete; Bedside commode;Perineal hygiene  (pt found grossly incontinent of bowel upon arrival, max A for brandon care in stance EOB vs BSC)   Bed Mobility   Supine to Sit 2  Maximal assistance   Additional items Assist x 1;HOB elevated; Bedrails; Increased time required;Verbal cues;LE management   Additional Comments OOB to recliner at end of session   Transfers   Sit to Stand 4  Minimal assistance   Additional items Assist x 1; Increased time required;Verbal cues   Stand to Sit 4  Minimal assistance   Additional items Assist x 1; Increased time required;Verbal cues   Stand pivot 4  Minimal assistance   Additional items Assist x 1; Increased time required;Verbal cues  (from EOB>BSC>recliner with min A x 1 and RW)   Additional Comments limited ability to advance BLEs due to pain   Functional Mobility   Additional Comments Unable to assess due to pain; will continue to assess   Balance   Static Sitting Fair +   Dynamic Sitting Fair   Static Standing Fair -   Dynamic Standing Poor +   Activity Tolerance   Activity Tolerance Patient limited by pain   Medical Staff Made Aware Care coordinated with PT Carlitos Mohr   Nurse Made Aware yes, RN Flor Oliveira ok to see pt and updated   RUE Assessment   RUE Assessment WFL   RUE Strength   RUE Overall Strength Within Functional Limits - able to perform ADL tasks with strength   LUE Assessment   LUE Assessment WFL   LUE Strength   LUE Overall Strength Within Functional Limits - able to perform ADL tasks with strength   Hand Function   Gross Motor Coordination Functional   Fine Motor Coordination Functional   Sensation   Light Touch No apparent deficits   Cognition   Overall Cognitive Status Impaired   Arousal/Participation Alert; Cooperative   Attention Attends with cues to redirect   Orientation Level Oriented to person;Disoriented to place; Disoriented to time;Disoriented to situation  ("I don't know" to place, time, situation)   Memory Decreased short term memory;Decreased recall of recent events;Decreased recall of precautions  (required reorientation; does not recall having surgery)   Following Commands Follows one step commands with increased time or repetition   Comments Pleasantly confused, required VC for initiation and problem solving basic tasks. Poor historian. Able to follow commands with repetition. Assessment   Limitation Decreased ADL status; Decreased Safe judgement during ADL;Decreased cognition;Decreased endurance;Decreased self-care trans;Decreased high-level ADLs  (pain, balance, fxnl mobility, act walter, fxnl reach, standing walter, strength and fxnl sitting balance, safety awareness, insight, orientation and initiation, response time)   Prognosis Good   Assessment Pt is a 80 y.o. female seen for OT evaluation s/p admit to [address] [place] [address] on 8/6/2023 w/Fracture of left femur ([address] Prior to admission, pt was living alone in a 1 level house with 24/7 caregivers, (I) with ADLs, (A) with IADLs, (+) falls, (-) . Personal and environmental factors affecting patient at time of evaluation include advanced age, inaccessible home environment, difficulty completing ADLs and difficulty completing IADLs. Personal factors supporting patient at time of evaluation include (I) PLOF, supportive 24/7 caregiver  and [facility]. Based upon this evaluation, pt is functioning below baseline. Pt will benefit from continued skilled inpatient OT to maximize safety, level of independence overall performance in ADLs, functional transfers, functional mobility to return to functional baseline/highest level of function. Goals   Patient Goals none stated due to cognition; will continue to assess   LTG Time Frame 10-14   Long Term Goal #1 see goals below   Plan   Treatment Interventions ADL retraining;Functional transfer training; Endurance training;Cognitive reorientation;Patient/family training;Equipment evaluation/education; Compensatory technique education;Continued evaluation; Energy conservation; Activityengagement   Goal Expiration Date 08/18/23   OT Treatment Day 0   OT Frequency 3-5x/wk   Recommendation   OT Discharge Recommendation Post acute rehabilitation services  (vs HHOT pending progress towards goals and availability of social support upon DC as pt currently requires A x 1 for ADLs/mobility)   Equipment Recommended Bedside commode   Commode Type Standard   Additional Comments  Pt seen as a co-eval with PT due to the patient's co-morbidities and clinically unstable presentation indicated by chart review. During session, pt benefited from two skilled therapists due to extensive physical assistance to achieve transitional movements and pt's decreased activity tolerance. Additional Comments 2 The patient's raw score on the AM-PAC Daily Activity inpatient short form is 16, standardized score is 35.96, less than 39.4. From an OT standpoint, patients at this level may benefit from d/c to Post acute rehabilitation services. AM-[facility] Daily Activity Inpatient   Lower Body Dressing 2   Bathing 2   Toileting 2   Upper Body Dressing 3   Grooming 3   Eating 4   Daily Activity Raw Score 16   Daily Activity Standardized Score (Calc for Raw Score >=11) 35.96   AM-PAC Applied Cognition Inpatient   Following a Speech/Presentation 3   Understanding Ordinary Conversation 4   Taking Medications 2   Remembering Where Things Are Placed or Put Away 3   Remembering List of 4-5 Errands 2   Taking Care of Complicated Tasks 2   Applied Cognition Raw Score 16   Applied Cognition Standardized Score 35.03   End of Consult   Patient Position at End of Consult Bedside chair;Bed/Chair alarm activated; All needs within reach   Nurse Communication Nurse aware of consult     GOALS:    *ADL transfers with (S) for inc'd independence with ADLs/purposeful tasks    *Patient will perform grooming tasks standing at sink with (S) in order to increase overall independence and dynamic standing balance    *LB ADL with Min (A) using AE prn for inc'd independence with self care and decreased caregiver burden    *Toileting with Min (A) for clothing management and hygiene to increase hygiene/thoroughness in order to reduce caregiver burden    *Increase stand tolerance x 5  m for inc'd tolerance with standing purposeful tasks    *Bed mobility- Min (A) for inc'd independence to manage own comfort and initiate EOB & OOB purposeful tasks    *Patient will verbalize 3 safety awareness/ principles to prevent falls in the home setting. *Patient will increase OOB/sitting tolerance to 2-4 hours per day to increase participation in self-care and leisure tasks with no s/s of exertion. *Patient will engage in ongoing cognitive assessment to assist with safe discharge planning/recommendations. *Patient will increase functional mobility to and from bathroom with rolling walker with supervision to increase independence with toileting    *Patient will improve functional activity tolerance to 20 minutes of sustained functional tasks to increase participation in basic self-care and decrease assistance level. *Patient will orient self x 4 with minimal verbal cues to increase overall awareness and promote safety with ADL/IADL tasks.      CHRIS Cohen, OTR/L    [place] License [id number]  [address] [place], [place] [id number]

## 2023-08-08 NOTE — PHYSICAL THERAPY NOTE
Physical Therapy Evaluation    Patient's Name: Torey Keen    Admitting Diagnosis  Femur fracture ([address]) [S72.90XA]  Hip pain [M25.559]    Problem List  Patient Active Problem List   Diagnosis    Fracture of left femur ([address])    Fall    Acute pain due to trauma    Pulmonary embolism ([address])    Hypertension    Acute blood loss anemia       Past Medical History  Past Medical History:   Diagnosis Date    CAD (coronary artery disease)     Hypertension     Pulmonary embolism ([address])        Past Surgical History  Past Surgical History:   Procedure Laterality Date    DE OPTX FEM SHFT FX W/INSJ IMED IMPLT W/WO SCREW Left 8/7/2023    Procedure: INSERTION NAIL IM FEMUR ANTEGRADE (TROCHANTERIC); Surgeon: Ajit Padilla DO;  [facility];  Service: Orthopedics      08/08/23 1007   Note Type   Note type Evaluation   Pain Assessment   Pain Assessment Tool FLACC   Pain Location/Orientation Orientation: Left; Location: Hip   Effect of Pain on Daily Activities limits bed mobility, transfers and ambulation   Hospital Pain Intervention(s) Repositioned; Ambulation/increased activity   Pain Rating: FLACC (Rest) - Face 0   Pain Rating: FLACC (Rest) - Legs 0   Pain Rating: FLACC (Rest) - Activity 0   Pain Rating: FLACC (Rest) - Cry 0   Pain Rating: FLACC (Rest) - Consolability 0   Score: FLACC (Rest) 0   Pain Rating: FLACC (Activity) - Face 1   Pain Rating: FLACC (Activity) - Legs 0   Pain Rating: FLACC (Activity) - Activity 1   Pain Rating: FLACC (Activity) - Cry 1   Pain Rating: FLACC (Activity) - Consolability 1   Score: FLACC (Activity) 4   Restrictions/Precautions   Weight Bearing Precautions Per Order Yes   LLE Weight Bearing Per Order WBAT  (POD#1 left femur IMN)   Other Precautions Cognitive; Chair Alarm; Bed Alarm;WBS;Fall Risk;O2;Pain  (2 L O2)   Home Living   Type of [address] [place] [address] One level;Performs ADLs on one level   Bathroom Equipment Shower chair   [address] Prior Function   Lives With Alone   Receives Help From Personal care attendant  ( PCA)   Falls in the last 6 months 1 to 4   Vocational Retired   General   Family/Caregiver Present No   Cognition   Orientation Level Oriented to person;Disoriented to place; Disoriented to time;Disoriented to situation   Memory Decreased recall of recent events;Decreased short term memory;Decreased recall of precautions   Following Commands Follows one step commands with increased time or repetition   Comments pt ID by wristband, name and    Subjective   Subjective pt supine in bed, agreeable to PT eval   RLE Assessment   RLE Assessment WFL   LLE Assessment   LLE Assessment X  (sensation intact L4-S1, 3/5 DF/PF, fair qaud set, 20 degrees of L hip flexion)   Bed Mobility   Supine to Sit 2  Maximal assistance   Additional items Assist x 1;HOB elevated; Bedrails; Increased time required;LE management  (trunk management)   Additional Comments pt OOB in recliner after chair swap   Transfers   Sit to Stand 4  Minimal assistance   Additional items Assist x 1; Increased time required;Verbal cues   Stand to Sit 4  Minimal assistance   Additional items Assist x 1; Increased time required;Verbal cues   Stand pivot 4  Minimal assistance   Additional items Assist x 1; Increased time required;Verbal cues   Toilet transfer 4  Minimal assistance   Additional items Assist x 1; Increased time required;Commode;Armrests; Verbal cues   Additional Comments pt with limited standing [place], limited by LE pain, difficulty performing dynamic weight shifts, required, vc for hand placement, trunk positioning   Ambulation/Elevation   Gait pattern Poor UE support;L Knee Ra; Improper Weight shift   Ambulation/Elevation Additional Comments pt unable to tolerate pre gait activites, advance and retreat   Balance   Static Sitting Fair +   Dynamic Sitting Fair   Static Standing Fair -   Dynamic Standing Poor +   Activity Tolerance   Activity Tolerance Patient limited by pain   Medical Staff Made Aware care coordinated with OT clinton   Nurse Made Aware YESI garcia pre/post   Assessment   Prognosis Fair   Problem List Decreased strength;Decreased range of motion;Decreased endurance; Impaired balance;Decreased cognition; Impaired judgement;Decreased safety awareness;Decreased skin integrity;Orthopedic restrictions;Pain   Assessment Pt is a 80 y.o. female seen for PT evaluation s/p admit [facility] on 8/6/2023. Pt was admitted with a primary dx of: fracture of left femur. Pt currently POD#1 of left IMN performed by Dr. Aashish Ocampo on 08/07/23. Pt is currently WBAT on L LE. [facility] PT now consulted for assessment of mobility and d/c needs. Pt with Up in chair orders. Pts current comorbidities and personal factors effecting treatment include: CAD, HTN, PE. Pts current clinical presentation is Unstable/Unpredictable (high complexity) due to Ongoing medical management for primary dx, Decreased activity tolerance compared to baseline, Fall risk, Increased assistance needed from caregiver at current time, Cog status, Current WBS, s/p surgical intervention. Prior to admission, pt was required family assistance. Upon evaluation, pt currently is requiring MaxA for bed mobility; Vick for transfers and currently unable to ambulate 2/2 LE pain. Pt presents at PT eval functioning below baseline and currently w/ overall mobility deficits 2* to: BLE weakness, impaired balance, impaired coordination, gait deviations, pain, decreased activity tolerance compared to baseline, decreased functional mobility tolerance compared to baseline, decreased safety awareness, impaired judgement, fall risk, decreased cognition. Pt currently at a fall risk 2* to impairments listed above. Pt will continue to benefit from skilled acute PT interventions to address stated impairments; to maximize functional mobility; for ongoing pt/ family training; and DME needs.  At conclusion of PT session chair alarm engaged, all needs in reach, RN notified of session findings/recommendations and pt returned back in recliner chair with phone and call bell within reach. Pt denies any further questions at this time. Recommend IP rehab upon hospital D/C. Goals   Patient Goals none stated by pt   STG Expiration Date 08/18/23   Short Term Goal #1 In 10 days pt will be able to: 1. Demonstrate ability to perform all aspects of bed mobility with Olga to improve functional safety. 2. Perform functional transfers with Olga to facilitate safe return to previous living environment. 3.  Ambulate 50 ft with RW and supervision with stable vitals to improve safety with household distances and reduce fall risk. 4. Improve LE strength grades by 1 to increase ease of functional mobility with transfers and gait. 5. Pt will demonstrate improved balance by one grade in order to decrease risk of falls. PT Treatment Day 0   Plan   Treatment/Interventions Functional transfer training;LE strengthening/ROM; Therapeutic exercise;Patient/family training;Equipment eval/education; Bed mobility;Gait training;Spoke to nursing;Spoke to case management;OT   PT Frequency 3-5x/wk   Recommendation   PT Discharge Recommendation Post acute rehabilitation services   Additional Comments vs home with HHPT if PCA can provide similar levels of assistance required   AM-PAC Basic Mobility Inpatient   Turning in Flat Bed Without Bedrails 1   Lying on Back to Sitting on Edge of Flat Bed Without Bedrails 1   Moving Bed to Chair 1   Standing Up From Chair Using Arms 1   Walk in Room 1   Climb 3-5 Stairs With Railing 1   Basic Mobility Inpatient Raw Score 6   Turning Head Towards Sound 3   Follow Simple Instructions 2   Low Function Basic Mobility Raw Score  11   Low Function Basic Mobility Standardized Score  16.55   Highest Level Of Mobility   JH-HLM Goal 2: Bed activities/Dependent transfer   JH-HLM Achieved 4: Move to chair/commode   End of Consult   Patient Position at End of Consult Bedside chair;Bed/Chair alarm activated; All needs within reach   The patient's AM-PAC Basic Mobility Inpatient Short Form Raw Score is 6. A Raw score of less than or equal to 16 suggests the patient may benefit from discharge to post-acute rehabilitation services. Please also refer to the recommendation of the Physical Therapist for safe discharge planning.   Ginny Weaver, PT

## 2023-08-08 NOTE — ASSESSMENT & PLAN NOTE
Pain is currently well-controlled  Continue Tylenol, gabapentin. Apply ice 3 times a day  Continue oxycodone as needed.   Optimize bowel regimen while on opiates  Continue PT OT  encourage out of bed as tolerated and incentive spirometry  Add protein supplements to her regimen

## 2023-08-08 NOTE — ASSESSMENT & PLAN NOTE
Hemoglobin stable today at 8.4  Continue to monitor CBC and transfuse if hemoglobin less than 7.0 or patient becomes symptomatic

## 2023-08-08 NOTE — PROGRESS NOTES
Orthopedics   [place] Hsieh 80 y.o. female MRN: [id number]  Unit/Bed#: S -01      Subjective:  80 y. o.female seen and evaluated this morning. She initially does not recall that she had surgery yesterday but when asked further questioning she does remember.   She denies any pain left hip at this time just some soreness    Labs:  0   Lab Value Date/Time    HCT 26.5 (L) 08/08/2023 0541    HCT 21.7 (L) 08/08/2023 0148    HCT 24.4 (L) 08/07/2023 1451    HGB 8.2 (L) 08/08/2023 0541    HGB 6.4 (LL) 08/08/2023 0148    HGB 7.4 (L) 08/07/2023 1451    INR 0.98 08/08/2023 0541    WBC 11.73 (H) 08/08/2023 0541    WBC 10.28 (H) 08/07/2023 1451    WBC 8.79 08/07/2023 0448       Meds:    Current Facility-Administered Medications:   •  acetaminophen (TYLENOL) tablet 975 mg, 975 mg, Oral, Q8H [facility], Jalyn Calvin MD, 975 mg at 08/08/23 5621  •  amLODIPine (NORVASC) tablet 5 mg, 5 mg, Oral, Daily, Jalyn Calvin MD, 5 mg at 08/08/23 3349  •  cholecalciferol (VITAMIN D3) tablet 2,000 Units, 2,000 Units, Oral, Daily, Jalyn Calvin MD, 2,000 Units at 99/70/14 5925  •  folic acid (FOLVITE) tablet 1 mg, 1 mg, Oral, Daily, Jalyn Calvin MD, 1 mg at 08/08/23 5314  •  gabapentin (NEURONTIN) capsule 100 mg, 100 mg, Oral, HS, Jalyn Calvin MD, 100 mg at 08/07/23 2240  •  heparin (porcine) 25,000 units in 0.45% NaCl 250 mL infusion (premix), 3-30 Units/kg/hr (Order-Specific), Intravenous, Titrated, Prashant Jenkins MD, Last Rate: 7.2 mL/hr at 08/08/23 0650, 18 Units/kg/hr at 08/08/23 0650  •  HYDROmorphone HCl (DILAUDID) injection 0.2 mg, 0.2 mg, Intravenous, Q4H PRN, Jalyn Calvin MD  •  isosorbide mononitrate (IMDUR) 24 hr tablet 30 mg, 30 mg, Oral, Daily, Jalyn Calvin MD, 30 mg at 08/08/23 4190  •  metoprolol succinate (TOPROL-XL) 24 hr tablet 25 mg, 25 mg, Oral, HS, Jalyn Calvin MD, 25 mg at 08/06/23 2106  •  [facility]) injection 4 mg, 4 mg, Intravenous, Q4H PRN, Jalyn Calvin MD  •  oxyCODONE (ROXICODONE) IR tablet 5 mg, 5 mg, Oral, Q4H PRN, Josie Maldonado MD, 5 mg at 08/06/23 5937  •  oxyCODONE (ROXICODONE) split tablet 2.5 mg, 2.5 mg, Oral, Q4H PRN, Josie Maldonado MD  •  polyethylene glycol (MIRALAX) packet 17 g, 17 g, Oral, Daily, Josie Maldonado MD    Blood Culture:   No results found for: "BLOODCX"    Wound Culture:   No results found for: "WOUNDCULT"    Ins and Outs:  I/O last 24 hours: In: 1061 [I.V.:500; Blood:490; IV Piggyback:50]  Out: 10 [Blood:10]          Physical exam:  Vitals:    08/08/23 0652   BP:    Pulse:    Resp: 16   Temp:    SpO2:      left lower extremity  · Dressing clean dry intact with the exception of some mild to moderate serosanguineous drainage in the posterior aspect of the proximal and distal Mepilex dressings without strikethrough  · Sensation intact to light touch L2-S1  · Motor intact to knee flexion/extension, EHL/FHL  · 2+ DP pulse    Assessment: 89 y. o.female post operative day 1 left femur IMN.  Pt doing well    Plan:  · Up and out of bed  · Weightbearing as tolerated bilateral lower extremity  · PT/OT  · DVT prophylaxis -continue with Eliquis  · Analgesics per primary team  · Dispo: Ortho will follow  · Patient noted to have acute blood loss anemia due to a drop in Hbg of > 2.0g from preop levels, will monitor vital signs and resuscitate with IV fluids as needed hemoglobin currently 8.2 was 6.4 overnight she received transfusion per primary team.  Overall vitals have been stable we will continue to monitor closely  · Follow-up with Dr. Michael Mercado in 2 weeks from surgery    Denisse Vasquez PA-C

## 2023-08-08 NOTE — PLAN OF CARE
Problem: PAIN - ADULT  Goal: Verbalizes/displays adequate comfort level or baseline comfort level  Description: Interventions:  - Encourage patient to monitor pain and request assistance  - Assess pain using appropriate pain scale  - Administer analgesics based on type and severity of pain and evaluate response  - Implement non-pharmacological measures as appropriate and evaluate response  - Consider cultural and social influences on pain and pain management  - Notify physician/advanced practitioner if interventions unsuccessful or patient reports new pain  Outcome: Progressing     Problem: INFECTION - ADULT  Goal: Absence or prevention of progression during hospitalization  Description: INTERVENTIONS:  - Assess and monitor for signs and symptoms of infection  - Monitor lab/diagnostic results  - Monitor all insertion sites, i.e. indwelling lines, tubes, and drains  - Monitor endotracheal if appropriate and nasal secretions for changes in amount and color  - [place] appropriate cooling/warming therapies per order  - Administer medications as ordered  - Instruct and encourage patient and family to use good hand hygiene technique  - Identify and instruct in appropriate isolation precautions for identified infection/condition  Outcome: Progressing  Goal: Absence of fever/infection during neutropenic period  Description: INTERVENTIONS:  - Monitor WBC    Outcome: Progressing

## 2023-08-08 NOTE — PROGRESS NOTES
Progress Note - Geriatric Medicine   [facility] 80 y.o. female MRN: [id number]  Unit/Bed#: S -01 Encounter: [id number]      Assessment/Plan:  Mild cognitive impairment  Assessment & Plan  Patient is alert oriented x 2-3, knows the month but she was not able to tell me the date. Needs assistance with IADLs and some of her ADLs  Supportive care and reorient as needed  Continue sleep-wake cycle  Manage pain as needed  Monitor for constipation and urinary retention  Avoid antihistaminics, anticholinergics, tramadol  Continue PT OT  Encourage family and friends to visit    Hard of hearing  Assessment & Plan  Use hearing amplifiers if needed    Acute blood loss anemia  Assessment & Plan  Hemoglobin stable today at 8.4  Continue to monitor CBC and transfuse if hemoglobin less than 7.0 or patient becomes symptomatic    Fall  Assessment & Plan  Monitor orthostatic vital signs  Recommend discontinue amlodipine  Avoid hypotension  Encourage protein supplements and p.o. intake  Continue PT OT    * Fracture of left femur (HCC)  Assessment & Plan  Pain is currently well-controlled  Continue Tylenol, gabapentin. Apply ice 3 times a day  Continue oxycodone as needed. Optimize bowel regimen while on opiates  Continue PT OT  encourage out of bed as tolerated and incentive spirometry  Add protein supplements to her regimen        Subjective:     Patient seen examined at bedside. Denies any acute complaints at the time of encounter. States that her appetite is preserved, denies difficulty sleeping. No pain at the time of encounter. Review of Systems   Constitutional: Negative for chills and fever. HENT: Positive for hearing loss. Negative for congestion and rhinorrhea. Respiratory: Negative for cough, shortness of breath and wheezing. Cardiovascular: Negative for chest pain, palpitations and leg swelling. Gastrointestinal: Negative for abdominal pain and constipation.    Endocrine: Negative for cold intolerance. Genitourinary: Negative for difficulty urinating, dysuria and hematuria. Musculoskeletal: Positive for arthralgias and gait problem. Skin: Positive for wound. Allergic/Immunologic: Negative for environmental allergies. Neurological: Negative for dizziness and seizures. Hematological: Does not bruise/bleed easily. Psychiatric/Behavioral: Negative for behavioral problems and sleep disturbance. Objective:     Vitals: Blood pressure 91/58, pulse 64, temperature 97.6 °F (36.4 °C), resp. rate 18, height 4' 3" (1.295 m), weight 43 kg (94 lb 12.8 oz), SpO2 (!) 85 %. ,Body mass index is 25.62 kg/m². Intake/Output Summary (Last 24 hours) at 8/8/2023 1423  Last data filed at 8/8/2023 9299  Gross per 24 hour   Intake 490 ml   Output --   Net 490 ml       Current Medications: Reviewed    Physical Exam:   Physical Exam  Vitals and nursing note reviewed. Constitutional:       General: She is not in acute distress. Appearance: She is well-developed. Comments: Frail looking   HENT:      Head: Normocephalic and atraumatic. Ears:      Comments: [place]     Mouth/Throat:      Mouth: Mucous membranes are dry. Comments: Poor dentition  Eyes:      Conjunctiva/sclera: Conjunctivae normal.   Cardiovascular:      Rate and Rhythm: Normal rate and regular rhythm. Heart sounds: No murmur heard. Pulmonary:      Effort: Pulmonary effort is normal. No respiratory distress. Breath sounds: Normal breath sounds. Abdominal:      Palpations: Abdomen is soft. Tenderness: There is no abdominal tenderness. Musculoskeletal:         General: No swelling. Cervical back: Neck supple. Right lower leg: No edema. Left lower leg: No edema. Skin:     General: Skin is warm and dry. Capillary Refill: Capillary refill takes less than 2 seconds. Findings: Bruising present. Neurological:      Mental Status: She is alert. Mental status is at baseline.       Comments: AAox2-3   Psychiatric:         Mood and Affect: Mood normal.          Invasive Devices     Peripheral Intravenous Line  Duration           Peripheral IV 08/07/23 Right;Ventral (anterior) Forearm <1 day    Peripheral IV 08/08/23 Left Antecubital <1 day                Lab, Imaging and other studies: I have personally reviewed pertinent reports.

## 2023-08-09 VITALS
DIASTOLIC BLOOD PRESSURE: 77 MMHG | WEIGHT: 94.8 LBS | HEART RATE: 76 BPM | SYSTOLIC BLOOD PRESSURE: 129 MMHG | RESPIRATION RATE: 18 BRPM | SYSTOLIC BLOOD PRESSURE: 129 MMHG | TEMPERATURE: 98.5 F | HEIGHT: 55 IN | BODY MASS INDEX: 21.94 KG/M2 | HEART RATE: 76 BPM | OXYGEN SATURATION: 90 % | RESPIRATION RATE: 18 BRPM | WEIGHT: 94.8 LBS | TEMPERATURE: 98.5 F | HEIGHT: 55 IN | OXYGEN SATURATION: 90 % | BODY MASS INDEX: 21.94 KG/M2 | DIASTOLIC BLOOD PRESSURE: 77 MMHG

## 2023-08-09 LAB
ANION GAP SERPL CALCULATED.3IONS-SCNC: 4 MMOL/L
ANION GAP SERPL CALCULATED.3IONS-SCNC: 4 MMOL/L
APTT PPP: 79 SECONDS (ref 23–37)
APTT PPP: 79 SECONDS (ref 23–37)
BASOPHILS # BLD AUTO: 0.04 THOUSANDS/ÂΜL (ref 0–0.1)
BASOPHILS # BLD AUTO: 0.04 THOUSANDS/ÂΜL (ref 0–0.1)
BASOPHILS NFR BLD AUTO: 0 % (ref 0–1)
BASOPHILS NFR BLD AUTO: 0 % (ref 0–1)
BUN SERPL-MCNC: 32 MG/DL (ref 5–25)
BUN SERPL-MCNC: 32 MG/DL (ref 5–25)
CALCIUM SERPL-MCNC: 8.3 MG/DL (ref 8.4–10.2)
CALCIUM SERPL-MCNC: 8.3 MG/DL (ref 8.4–10.2)
CHLORIDE SERPL-SCNC: 103 MMOL/L (ref 96–108)
CHLORIDE SERPL-SCNC: 103 MMOL/L (ref 96–108)
CO2 SERPL-SCNC: 33 MMOL/L (ref 21–32)
CO2 SERPL-SCNC: 33 MMOL/L (ref 21–32)
CREAT SERPL-MCNC: 1.06 MG/DL (ref 0.6–1.3)
CREAT SERPL-MCNC: 1.06 MG/DL (ref 0.6–1.3)
EOSINOPHIL # BLD AUTO: 0.46 THOUSAND/ÂΜL (ref 0–0.61)
EOSINOPHIL # BLD AUTO: 0.46 THOUSAND/ÂΜL (ref 0–0.61)
EOSINOPHIL NFR BLD AUTO: 5 % (ref 0–6)
EOSINOPHIL NFR BLD AUTO: 5 % (ref 0–6)
ERYTHROCYTE [DISTWIDTH] IN BLOOD BY AUTOMATED COUNT: 19.6 % (ref 11.6–15.1)
ERYTHROCYTE [DISTWIDTH] IN BLOOD BY AUTOMATED COUNT: 19.6 % (ref 11.6–15.1)
GFR SERPL CREATININE-BSD FRML MDRD: 42 ML/MIN/1.73SQ M
GFR SERPL CREATININE-BSD FRML MDRD: 42 ML/MIN/1.73SQ M
GLUCOSE SERPL-MCNC: 85 MG/DL (ref 65–140)
GLUCOSE SERPL-MCNC: 85 MG/DL (ref 65–140)
HCT VFR BLD AUTO: 24.6 % (ref 34.8–46.1)
HCT VFR BLD AUTO: 24.6 % (ref 34.8–46.1)
HGB BLD-MCNC: 7.7 G/DL (ref 11.5–15.4)
HGB BLD-MCNC: 7.7 G/DL (ref 11.5–15.4)
IMM GRANULOCYTES # BLD AUTO: 0.05 THOUSAND/UL (ref 0–0.2)
IMM GRANULOCYTES # BLD AUTO: 0.05 THOUSAND/UL (ref 0–0.2)
IMM GRANULOCYTES NFR BLD AUTO: 1 % (ref 0–2)
IMM GRANULOCYTES NFR BLD AUTO: 1 % (ref 0–2)
LYMPHOCYTES # BLD AUTO: 2.08 THOUSANDS/ÂΜL (ref 0.6–4.47)
LYMPHOCYTES # BLD AUTO: 2.08 THOUSANDS/ÂΜL (ref 0.6–4.47)
LYMPHOCYTES NFR BLD AUTO: 22 % (ref 14–44)
LYMPHOCYTES NFR BLD AUTO: 22 % (ref 14–44)
MCH RBC QN AUTO: 30.2 PG (ref 26.8–34.3)
MCH RBC QN AUTO: 30.2 PG (ref 26.8–34.3)
MCHC RBC AUTO-ENTMCNC: 31.3 G/DL (ref 31.4–37.4)
MCHC RBC AUTO-ENTMCNC: 31.3 G/DL (ref 31.4–37.4)
MCV RBC AUTO: 97 FL (ref 82–98)
MCV RBC AUTO: 97 FL (ref 82–98)
MONOCYTES # BLD AUTO: 0.92 THOUSAND/ÂΜL (ref 0.17–1.22)
MONOCYTES # BLD AUTO: 0.92 THOUSAND/ÂΜL (ref 0.17–1.22)
MONOCYTES NFR BLD AUTO: 10 % (ref 4–12)
MONOCYTES NFR BLD AUTO: 10 % (ref 4–12)
NEUTROPHILS # BLD AUTO: 6.13 THOUSANDS/ÂΜL (ref 1.85–7.62)
NEUTROPHILS # BLD AUTO: 6.13 THOUSANDS/ÂΜL (ref 1.85–7.62)
NEUTS SEG NFR BLD AUTO: 62 % (ref 43–75)
NEUTS SEG NFR BLD AUTO: 62 % (ref 43–75)
NRBC BLD AUTO-RTO: 0 /100 WBCS
NRBC BLD AUTO-RTO: 0 /100 WBCS
PLATELET # BLD AUTO: 198 THOUSANDS/UL (ref 149–390)
PLATELET # BLD AUTO: 198 THOUSANDS/UL (ref 149–390)
PMV BLD AUTO: 11.8 FL (ref 8.9–12.7)
PMV BLD AUTO: 11.8 FL (ref 8.9–12.7)
POTASSIUM SERPL-SCNC: 3.9 MMOL/L (ref 3.5–5.3)
POTASSIUM SERPL-SCNC: 3.9 MMOL/L (ref 3.5–5.3)
RBC # BLD AUTO: 2.55 MILLION/UL (ref 3.81–5.12)
RBC # BLD AUTO: 2.55 MILLION/UL (ref 3.81–5.12)
SODIUM SERPL-SCNC: 140 MMOL/L (ref 135–147)
SODIUM SERPL-SCNC: 140 MMOL/L (ref 135–147)
WBC # BLD AUTO: 9.68 THOUSAND/UL (ref 4.31–10.16)
WBC # BLD AUTO: 9.68 THOUSAND/UL (ref 4.31–10.16)

## 2023-08-09 PROCEDURE — NC001 PR NO CHARGE: Performed by: PHYSICIAN ASSISTANT

## 2023-08-09 PROCEDURE — 99238 HOSP IP/OBS DSCHRG MGMT 30/<: CPT | Performed by: PHYSICIAN ASSISTANT

## 2023-08-09 PROCEDURE — 80048 BASIC METABOLIC PNL TOTAL CA: CPT | Performed by: ORTHOPAEDIC SURGERY

## 2023-08-09 PROCEDURE — 85730 THROMBOPLASTIN TIME PARTIAL: CPT | Performed by: SURGERY

## 2023-08-09 PROCEDURE — 99024 POSTOP FOLLOW-UP VISIT: CPT | Performed by: PHYSICIAN ASSISTANT

## 2023-08-09 PROCEDURE — 85025 COMPLETE CBC W/AUTO DIFF WBC: CPT | Performed by: ORTHOPAEDIC SURGERY

## 2023-08-09 PROCEDURE — 99232 SBSQ HOSP IP/OBS MODERATE 35: CPT | Performed by: NURSE PRACTITIONER

## 2023-08-09 RX ORDER — ACETAMINOPHEN 325 MG/1
650 TABLET ORAL EVERY 4 HOURS PRN
Refills: 0
Start: 2023-08-09

## 2023-08-09 RX ORDER — POLYETHYLENE GLYCOL 3350 17 G/17G
17 POWDER, FOR SOLUTION ORAL DAILY
Refills: 0
Start: 2023-08-10 | End: 2023-08-14

## 2023-08-09 RX ORDER — GABAPENTIN 100 MG/1
100 CAPSULE ORAL
Refills: 0
Start: 2023-08-09 | End: 2023-08-14

## 2023-08-09 RX ADMIN — ACETAMINOPHEN 975 MG: 325 TABLET, FILM COATED ORAL at 14:03

## 2023-08-09 RX ADMIN — Medication 2000 UNITS: at 08:40

## 2023-08-09 RX ADMIN — APIXABAN 5 MG: 5 TABLET, FILM COATED ORAL at 10:56

## 2023-08-09 RX ADMIN — FOLIC ACID 1 MG: 1 TABLET ORAL at 08:40

## 2023-08-09 RX ADMIN — ISOSORBIDE MONONITRATE 30 MG: 30 TABLET, EXTENDED RELEASE ORAL at 08:40

## 2023-08-09 RX ADMIN — AMLODIPINE BESYLATE 5 MG: 5 TABLET ORAL at 08:40

## 2023-08-09 RX ADMIN — ACETAMINOPHEN 975 MG: 325 TABLET, FILM COATED ORAL at 05:31

## 2023-08-09 NOTE — ASSESSMENT & PLAN NOTE
- Patient noted to have acute blood loss secondary to femur fracture and operative intervention this hospital encounter.   - No evidence of active or ongoing bleeding.  - Patient is status post transfusion of 1 unit packed red blood cells postoperatively. - Patient had appropriate response in hemoglobin. - Continue to monitor hemodynamic status.  - Outpatient follow-up with PCP recommended.

## 2023-08-09 NOTE — PROGRESS NOTES
Progress Note - Geriatric Medicine   [facility] 80 y.o. female MRN: [id number]  Unit/Bed#: S -01 Encounter: [id number]      Assessment/Plan:  Left femur fracture  · S/p fall  · Orthopedics was consulted  · Underwent surgical goal fixation of the left peritrochanteric femur fracture with intramedullary nail  · Multimodal pain regimen including geriatric pain protocol  · PT/OT following  · Fall precautions  · Outpatient follow-up with orthopedics    Insomnia  First-line treatment is behavior modification  Maintain sleep-wake cycle, avoid nighttime interruptions  Avoid caffeine throughout the day  Avoid napping throughout the day  Encourage physical activity throughout the day  · Avoid sedative hypnotics including benzodiazepines and benadryl  · If needed could trial melatonin 3 mg nightly    Delirium precautions  Patient is alert oriented x4-forgetful at times  At risk for delirium due to hospitalization, medications, pain, sleep disturbance  Recommend delirium precautions  Maintain sleep-wake cycle, avoid nighttime interruptions  minimize overnight interruptions, group overnight vitals/labs/nursing checks as possible  dim lights, close blinds and turn off tv to minimize stimulation and encourage sleep environment in evenings  Provide adequate pain control  Avoid urinary retention and constipation  Provide frequent and early mobilization  Provide frequent redirection and reorientation as needed  Avoid medications that may worsen or precipitate delirium such as tramadol, benzodiazepines, anticholinergics, and Benadryl  Redirect unwanted behaviors as first-line therapy, avoid physical restraints as able to  · Continue to monitor    Ambulatory dysfunction  At a baseline ambulates with walker  PT/OT following  Fall precautions  Out of bed as tolerated  Encourage early and frequent mobilization  Encourage adequate hydration and nutrition  Provide adequate pain management   Goal is short-term rehab  · Continue with PT/OT for continued strength and balance training     Subjective:   Patient is being seen for geriatric follow-up. Upon examination patient is lying bed, resting. She appeared comfortable and was in no acute distress. She denied any pain on exam.  Reports her appetite is stable. She slept well overnight. She reports she moved her bowels today. Per nursing no acute concerns or issues at this time. Review of Systems   Constitutional: Negative for activity change, appetite change and fever. HENT: Negative for tinnitus. Eyes: Negative for visual disturbance. Respiratory: Negative for cough and shortness of breath. Cardiovascular: Negative for chest pain and palpitations. Gastrointestinal: Negative for abdominal pain, constipation, diarrhea and vomiting. Genitourinary: Negative for difficulty urinating, dysuria and hematuria. Musculoskeletal: Positive for arthralgias and gait problem. Negative for back pain. Skin: Negative for color change and rash. Neurological: Positive for weakness. Negative for dizziness, light-headedness and headaches. Psychiatric/Behavioral: Positive for confusion. Negative for sleep disturbance. The patient is not nervous/anxious. Objective:     Vitals: Blood pressure 112/56, pulse (!) 52, temperature 97.9 °F (36.6 °C), resp. rate 17, height 4' 3" (1.295 m), weight 43 kg (94 lb 12.8 oz), SpO2 95 %. ,Body mass index is 25.62 kg/m². No intake or output data in the 24 hours ending 08/09/23 1532    Current Medications: Reviewed    Physical Exam:   Physical Exam  Vitals reviewed. Constitutional:       General: She is not in acute distress. Appearance: She is well-developed. She is not ill-appearing. Comments: Frail-appearing   HENT:      Head: Normocephalic and atraumatic. Cardiovascular:      Rate and Rhythm: Normal rate and regular rhythm. Heart sounds: Murmur heard.    Pulmonary:      Effort: Pulmonary effort is normal. No respiratory distress. Breath sounds: Normal breath sounds. Abdominal:      General: Bowel sounds are normal. There is no distension. Palpations: Abdomen is soft. Tenderness: There is no abdominal tenderness. Musculoskeletal:         General: Tenderness and signs of injury present. No swelling. Right lower leg: No edema. Left lower leg: No edema. Skin:     General: Skin is warm and dry. Coloration: Skin is pale. Neurological:      General: No focal deficit present. Mental Status: She is alert and oriented to person, place, and time. Mental status is at baseline. Motor: Weakness present. Gait: Gait abnormal.   Psychiatric:         Mood and Affect: Mood normal.          Invasive Devices     Peripheral Intravenous Line  Duration           Peripheral IV 08/07/23 Right;Ventral (anterior) Forearm 1 day    Peripheral IV 08/08/23 Left Antecubital 1 day                Lab, Imaging and other studies:    Lab Results:   I have personally reviewed pertinent lab results including the following:  - cbc, bmp    I have personally reviewed the following imaging study reports in PACS:    - I have personally reviewed pertinent reports. Please note:  Voice-recognition software may have been used in the preparation of this document. Occasional wrong word or "sound-alike" substitutions may have occurred due to the inherent limitations of voice recognition software. Interpretation should be guided by context.

## 2023-08-09 NOTE — ASSESSMENT & PLAN NOTE
- Patient with prior history of PE.   - Per patient's POA, she was diagnosed with acute PE within the last couple of months at [facility]. She has been on Eliquis and 4 L oxygen NC since that time. - Anticoagulation transitioned off of heparin infusion back to home Eliquis dosing on 8/9/2023.  - Continue supplemental oxygen as needed/appropriate. - Outpatient follow-up per routine.

## 2023-08-09 NOTE — PROGRESS NOTES
Orthopedics   Stacie Flores 80 y.o. female MRN: [id number]  Unit/Bed#: S -01      Subjective:  80 y. o.female post operative day 2 left femoral intramedullary nail. Pt doing well. Pain controlled. No acute events, no complaints.      Labs:  0   Lab Value Date/Time    HCT 24.6 (L) 08/09/2023 0445    HCT 26.5 (L) 08/08/2023 0541    HCT 21.7 (L) 08/08/2023 0148    HGB 7.7 (L) 08/09/2023 0445    HGB 8.2 (L) 08/08/2023 0541    HGB 6.4 (LL) 08/08/2023 0148    INR 0.98 08/08/2023 0541    WBC 9.68 08/09/2023 0445    WBC 11.73 (H) 08/08/2023 0541    WBC 10.28 (H) 08/07/2023 1451       Meds:    Current Facility-Administered Medications:   •  acetaminophen (TYLENOL) tablet 975 mg, 975 mg, Oral, Q8H [address], Vida Bernard MD, 975 mg at 08/09/23 0531  •  amLODIPine (NORVASC) tablet 5 mg, 5 mg, Oral, Daily, Vida Bernard MD, 5 mg at 08/09/23 0840  •  apixaban (ELIQUIS) tablet 5 mg, 5 mg, Oral, BID, Kush Guerra PA-C, 5 mg at 08/09/23 1056  •  cholecalciferol (VITAMIN D3) tablet 2,000 Units, 2,000 Units, Oral, Daily, Vida Bernard MD, 2,000 Units at 22/30/93 0009  •  folic acid (FOLVITE) tablet 1 mg, 1 mg, Oral, Daily, Vida Bernard MD, 1 mg at 08/09/23 0840  •  gabapentin (NEURONTIN) capsule 100 mg, 100 mg, Oral, HS, Vida Bernard MD, 100 mg at 08/08/23 2128  •  HYDROmorphone HCl (DILAUDID) injection 0.2 mg, 0.2 mg, Intravenous, Q4H PRN, Vida Bernard MD  •  isosorbide mononitrate (IMDUR) 24 hr tablet 30 mg, 30 mg, Oral, Daily, Vida Bernard MD, 30 mg at 08/09/23 0840  •  metoprolol succinate (TOPROL-XL) 24 hr tablet 25 mg, 25 mg, Oral, HS, Vida Bernard MD, 25 mg at 08/08/23 2128  •  ondansetron [facility]) injection 4 mg, 4 mg, Intravenous, Q4H PRN, Vida Bernard MD  •  oxyCODONE (ROXICODONE) IR tablet 5 mg, 5 mg, Oral, Q4H PRN, Vida Bernard MD, 5 mg at 08/06/23 1737  •  oxyCODONE (ROXICODONE) split tablet 2.5 mg, 2.5 mg, Oral, Q4H PRN, Vida Bernard MD  •  polyethylene glycol (MIRALAX) packet 17 g, 17 g, Oral, Daily, Brandt White MD    Blood Culture:   No results found for: "BLOODCX"    Wound Culture:   No results found for: "WOUNDCULT"    Ins and Outs:  No intake/output data recorded. Physical exam:  Vitals:    08/09/23 1025   BP: 112/56   Pulse: (!) 52   Resp:    Temp: 97.9 °F (36.6 °C)   SpO2: 95%     left lower extremity  · Dressing clean dry intact  · Sensation intact L2-S1  · Motor intact to knee flexion/extension, EHL/FHL  · 2+ DP pulse    Assessment: 89 y. o.female post operative day 2 left femur IMN.  Pt doing well    Plan:  · Up and out of bed  · Weightbearing as tolerated left lower extremity  · PT/OT  · DVT prophylaxis   · Continue with home Eliquis  · Analgesics  · Dispo: Shae Sinks for discharge from ortho perspective   · PENDING SNF placement  · Follow up with Dr. Mray Metz in 2 weeks  · Patient noted to have acute blood loss anemia due to a drop in Hbg of > 2.0g from preop levels, will monitor vital signs and resuscitate with IV fluids as needed   · Patient did require a blood transfusion    Dearl YUNG Cox

## 2023-08-09 NOTE — ASSESSMENT & PLAN NOTE
- Patient with chronic history of hypertension.  - Continue current medication regimen and resume home medication regimen on discharge as appropriate. - Outpatient follow-up routine.

## 2023-08-09 NOTE — DISCHARGE SUMMARY
[address]  Discharge- Carter Bettencourt 4/6/1920, 80 y.o. female MRN: [id number]  Unit/Bed#: S -01 Encounter: [id number]  Primary Care Provider: STEFFANIE El   Date and time admitted to hospital: 8/6/2023 10:19 AM    Fall  Assessment & Plan  - Status post mechanical fall with the below noted injuries. - Fall precautions. - Geriatric Medicine consultation for evaluation, medication review and recommendations.  - PT and OT evaluation and treatment as indicated. - Case Management consultation for disposition planning. * Fracture of left femur ([address])  Assessment & Plan  - Left femoral fracture, present on admission.  - Appreciate Orthopedic surgery evaluation, recommendations and interventions as noted. - Status post ORIF of left peritrochanteric femur fracture with IM nail insertion on 8/7/2023.  - May be weightbearing as tolerated on the left lower extremity.  - Monitor left lower extremity neurovascular exam.  - Continue multimodal analgesic regimen.  - Continue DVT prophylaxis. - PT and OT evaluation and treatment as indicated. - Outpatient follow up with Orthopedic surgery for re-evaluation. Acute pain due to trauma  Assessment & Plan  - Acute pain secondary to traumatic injuries. - Appreciate APS evaluation and recommendations. - Bowel regimen as long as using opioids.  - Continue to monitor pain and adjust regimen as indicated. Acute blood loss anemia  Assessment & Plan  - Patient noted to have acute blood loss secondary to femur fracture and operative intervention this hospital encounter.   - No evidence of active or ongoing bleeding.  - Patient is status post transfusion of 1 unit packed red blood cells postoperatively. - Patient had appropriate response in hemoglobin. - Continue to monitor hemodynamic status.  - Outpatient follow-up with PCP recommended.     Pulmonary embolism [facility])  Assessment & Plan  - Patient with prior history of PE.   - Per patient's POA, she was diagnosed with acute PE within the last couple of months at [facility]. She has been on Eliquis and 4 L oxygen NC since that time. - Anticoagulation transitioned off of heparin infusion back to home Eliquis dosing on 8/9/2023.  - Continue supplemental oxygen as needed/appropriate. - Outpatient follow-up per routine. Hypertension  Assessment & Plan  - Patient with chronic history of hypertension.  - Continue current medication regimen and resume home medication regimen on discharge as appropriate. - Outpatient follow-up routine. Discharge Summary - Trauma Service   Adina Hills 80 y.o. female MRN: [id number]  Unit/Bed#: S -01 Encounter: [id number]    Admission Date: 8/6/2023     Discharge Date: 8/9/2023    Admitting Diagnosis: Femur fracture ([address]) [S72.90XA]  Hip pain [M25.559]    Discharge Diagnosis: See above. Attending and Service: Dr. Cesar Shankar, Acute Care Surgical Services. Consulting Physician(s):     1. Orthopedic surgery. 2.  Geriatric medicine. Imaging and Procedures Performed:   Orders Placed This Encounter   Procedures   • Fast Ultrasound       XR hip/pelv 2-3 vws left if performed    Result Date: 8/7/2023  Impression: Fluoroscopic guidance provided for procedure guidance. Please refer to the separate procedure notes for additional details. Workstation performed: [id number]     XR chest 1 view    Result Date: 8/7/2023  Impression: Acute, mildly displaced intertrochanteric fracture of left femur. As per review of electronic medical record, trauma team aware of this finding at time of this dictation. Severely limited evaluation of the thorax due to patient rotation. No definite pneumothorax. No obvious displaced thoracic fractures within limitation of supine AP chest technique. Workstation performed: [id number]     XR pelvis ap only 1 or 2 vw    Result Date: 8/7/2023  Impression: Acute, mildly displaced intertrochanteric fracture of left femur.  As per review of electronic medical record, trauma team aware of this finding at time of this dictation. Severely limited evaluation of the thorax due to patient rotation. No definite pneumothorax. No obvious displaced thoracic fractures within limitation of supine AP chest technique. Workstation performed: [id number]     XR femur 2 vw left    Result Date: 8/7/2023  Impression: Acute minimally displaced intertrochanteric fracture of the left hip. Workstation performed: [id number]     XR Trauma multiple ([facility] trauma bay ONLY)    Result Date: 8/6/2023  Impression: Acute, mildly displaced intertrochanteric fracture of left femur. As per review of electronic medical record, trauma team aware of this finding at time of this dictation. Severely limited evaluation of the thorax due to patient rotation. No definite pneumothorax. No obvious displaced thoracic fractures within limitation of supine AP chest technique. Workstation performed: [id number]     TRAUMA - CT spine cervical wo contrast    Result Date: 8/6/2023  Impression: No cervical spine fracture or traumatic malalignment. Moderate spondylosis. Workstation performed: [id number]     TRAUMA - CT head wo contrast    Result Date: 8/6/2023  Impression: No intracranial hemorrhage or calvarial fracture. Moderate microangiopathy Workstation performed: [id number]     ORIF of left peritrochanteric femur fracture with IM nail insertion on 8/7/2023. Hospital Course: Laura Alvarez is a 8-year-old female who presented as a level B trauma alert following a mechanical fall at home in her kitchen. She denied hitting her head or losing consciousness. She complained of left hip pain after falling onto that hip. She does take Eliquis for history of recent DVT. On her initial trauma evaluation, her primary survey was unremarkable.   On secondary survey, she was hypertensive with normal vital signs otherwise; she had tenderness over her left hip and inability to lift her left leg secondary to pain; remainder of her exam was unremarkable. Her initial workup included labs and the above imaging studies. She was admitted to the trauma service status post mechanical fall with left peritrochanteric femur fracture, acute pain due to trauma and multiple medical comorbidities including recent PE requiring home oxygen use and hypertension. Orthopedic surgery was consulted and recommended operative intervention. The patient agreed to proceed with surgery and underwent ORIF of left peritrochanteric femur fracture with IM nail insertion on 8/7/2023. Geriatric medicine was also consulted during her hospital encounter and assisted with her medication review medical management. Postoperatively, she was initially managed on a heparin infusion and was found to have acute blood loss anemia due to her fracture and surgery requiring 1 unit of packed red blood cell transfusion during her hospital stay. When her hemoglobin was deemed stable with no evidence of active or ongoing bleeding, her heparin infusion was discontinued and she was transition back to anticoagulation with Eliquis. PT and OT evaluated and recommended rehab. Case management assisting with disposition planning, she was deemed stable for discharge with appropriate available placement on 8/9/2023. For further details of her hospital encounter, please see her complete medical records. On discharge, the patient is instructed to follow-up with the patient's primary care provider to review the events of the patient's recent hospitalization. The patient is instructed to follow-up in the Trauma Clinic as needed. The patient is instructed to follow-up with orthopedic surgery in 2 weeks for postoperative reevaluation. The patient should follow the provided discharge instructions.     Condition at Discharge: stable     Discharge instructions/Information to patient and family:   See after visit summary for information provided to patient and family. Provisions for Follow-Up Care:  See after visit summary for information related to follow-up care and any pertinent home health orders. Disposition: See After Visit Summary for discharge disposition information. Planned Readmission: No    Discharge Statement   I spent 30 minutes discharging the patient. This time was spent on the day of discharge. I had direct contact with the patient on the day of discharge. Additional documentation is required if more than 30 minutes were spent on discharge. Discharge Medications:  See after visit summary for reconciled discharge medications provided to patient and family.       Maki Rivera PA-C  8/9/2023  4:00 PM

## 2023-08-09 NOTE — ASSESSMENT & PLAN NOTE
- Left femoral fracture, present on admission.  - Appreciate Orthopedic surgery evaluation, recommendations and interventions as noted. - Status post ORIF of left peritrochanteric femur fracture with IM nail insertion on 8/7/2023.  - May be weightbearing as tolerated on the left lower extremity.  - Monitor left lower extremity neurovascular exam.  - Continue multimodal analgesic regimen.  - Continue DVT prophylaxis. - PT and OT evaluation and treatment as indicated. - Outpatient follow up with Orthopedic surgery for re-evaluation.

## 2023-08-09 NOTE — NURSING NOTE
Patient discharged to [facility]. IV removed per protocol. All belongings were sent with patient. AVS was reviewed in detail with receiving facility. All questions and concerns were addressed at this time.

## 2023-08-09 NOTE — PLAN OF CARE
Problem: PAIN - ADULT  Goal: Verbalizes/displays adequate comfort level or baseline comfort level  Description: Interventions:  - Encourage patient to monitor pain and request assistance  - Assess pain using appropriate pain scale  - Administer analgesics based on type and severity of pain and evaluate response  - Implement non-pharmacological measures as appropriate and evaluate response  - Consider cultural and social influences on pain and pain management  - Notify physician/advanced practitioner if interventions unsuccessful or patient reports new pain  Outcome: Progressing     Problem: INFECTION - ADULT  Goal: Absence or prevention of progression during hospitalization  Description: INTERVENTIONS:  - Assess and monitor for signs and symptoms of infection  - Monitor lab/diagnostic results  - Monitor all insertion sites, i.e. indwelling lines, tubes, and drains  - Monitor endotracheal if appropriate and nasal secretions for changes in amount and color  - [place] appropriate cooling/warming therapies per order  - Administer medications as ordered  - Instruct and encourage patient and family to use good hand hygiene technique  - Identify and instruct in appropriate isolation precautions for identified infection/condition  Outcome: Progressing  Goal: Absence of fever/infection during neutropenic period  Description: INTERVENTIONS:  - Monitor WBC    Outcome: Progressing

## 2023-08-09 NOTE — CASE MANAGEMENT
Case Management Discharge Planning Note    Patient name Ruy Sorto  Location S /S -01 MRN [id number]  : 1920 Date 2023       Current Admission Date: 2023  Current Admission Diagnosis:Fracture of left femur [facility])   Patient Active Problem List    Diagnosis Date Noted   • Hard of hearing 2023   • Mild cognitive impairment 2023   • Acute blood loss anemia 2023   • Fracture of left femur ([address]) 2023   • Fall 2023   • Acute pain due to trauma 2023   • Pulmonary embolism ([address]) 2023   • Hypertension 2023      LOS (days): 3  Geometric Mean LOS (GMLOS) (days): 4.40  Days to GMLOS:1.2     OBJECTIVE:  Risk of Unplanned Readmission Score: 14.85         Current admission status: Inpatient   Preferred Pharmacy: No Pharmacies Listed  Primary Care Provider: STEFFANIE Bean    Primary Insurance: MEDICARE  Secondary Insurance: BLUE CROSS    DISCHARGE DETAILS:    Discharge planning discussed with[de-identified] [address] of Choice: Yes  Comments - Freedom of Choice: CM informed [facility] able to accept today and she accepts bed offer.   CM contacted family/caregiver?: Yes  Were Treatment Team discharge recommendations reviewed with patient/caregiver?: Yes  Did patient/caregiver verbalize understanding of patient care needs?: N/A- going to facility  Were patient/caregiver advised of the risks associated with not following Treatment Team discharge recommendations?: Yes    Contacts  Patient Contacts: Neha Thompson  Relationship to Patient[de-identified] Friend (POA)  Contact Method: Phone  Phone Number: [phone]  Reason/Outcome: Discharge  [facility] [address]         Is the patient interested in [facility] at discharge?: No    DME Referral Provided  Referral made for DME?: No    Other Referral/Resources/Interventions Provided:  Interventions: Short Term Rehab  Referral Comments: Kimberly    Would you like to participate in our [facility] service program?  : No - Declined    Treatment Team Recommendation: Short Term Rehab  Discharge Destination Plan[de-identified] Short Term Rehab  Transport at Discharge : BLS Ambulance  Dispatcher Contacted: Yes  Number/Name of Dispatcher: Frances Beltrán by [employer] and Unit #):  [address]  ETA of Transport (Date): 08/09/23  ETA of Transport (Time): 4202     Transfer Mode: Stretcher  Accompanied by: EMS personnel     IMM Given (Date):: 08/09/23  IMM Given to[de-identified] [address] [place] [address] Name, [address] [facility]  Receiving Facility/Agency Phone Number: [phone] ext [phone]  Facility/Agency Fax Number: [phone]

## 2023-08-09 NOTE — ASSESSMENT & PLAN NOTE
- Patient with prior history of PE.   - Per patient's POA, she was diagnosed with acute PE within the last couple of months at [facility]. She has been on Eliquis and 4 L oxygen NC since that time. - Anticoagulation transitioned off of heparin infusion back to home Eliquis dosing on 8/9/2023.  - Continue supplemental oxygen as needed/appropriate. - Outpatient follow-up per routine.

## 2023-08-09 NOTE — INCIDENTAL FINDINGS
The following findings require follow up:  Radiographic finding     Findings and Follow up required:       1) Lobulated calcifications in the region of the pelvis, likely related to calcified uterine fibroids, were incidentally identified on your trauma imaging. A malignancy (cancer) cannot be completely excluded based on trauma imaging alone. Recommend short-term outpatient follow-up with primary care provider to review the finding and for further surveillance as indicated. Follow up should be done within 2 week(s)    The above noted incidental findings were discussed with the patient. The patient demonstrated understanding of the findings and the follow-up recommendations.     Please notify the following clinician to assist with the follow up:   Dr. Elijah Eldridge

## 2023-08-09 NOTE — PLAN OF CARE
Problem: MOBILITY - ADULT  Goal: Maintain or return to baseline ADL function  Description: INTERVENTIONS:  -  Assess patient's ability to carry out ADLs; assess patient's baseline for ADL function and identify physical deficits which impact ability to perform ADLs (bathing, care of mouth/teeth, toileting, grooming, dressing, etc.)  - Assess/evaluate cause of self-care deficits   - Assess range of motion  - Assess patient's mobility; develop plan if impaired  - Assess patient's need for assistive devices and provide as appropriate  - Encourage maximum independence but intervene and supervise when necessary  - Involve family in performance of ADLs  - Assess for home care needs following discharge   - Consider OT consult to assist with ADL evaluation and planning for discharge  - Provide patient education as appropriate  Outcome: Adequate for Discharge  Goal: Maintains/Returns to pre admission functional level  Description: INTERVENTIONS:  - Perform BMAT or MOVE assessment daily.   - Set and communicate daily mobility goal to care team and patient/family/caregiver. - Collaborate with rehabilitation services on mobility goals if consulted  - Perform Range of Motion  times a day. - Reposition patient every hours.   - Dangle patient  times a day  - Stand patient times a day  - Ambulate patient  times a day  - Out of bed to chair times a day   - Out of bed for meals times a day  - Out of bed for toileting  - Record patient progress and toleration of activity level   Outcome: Adequate for Discharge     Problem: PAIN - ADULT  Goal: Verbalizes/displays adequate comfort level or baseline comfort level  Description: Interventions:  - Encourage patient to monitor pain and request assistance  - Assess pain using appropriate pain scale  - Administer analgesics based on type and severity of pain and evaluate response  - Implement non-pharmacological measures as appropriate and evaluate response  - Consider cultural and social influences on pain and pain management  - Notify physician/advanced practitioner if interventions unsuccessful or patient reports new pain  Outcome: Adequate for Discharge     Problem: INFECTION - ADULT  Goal: Absence or prevention of progression during hospitalization  Description: INTERVENTIONS:  - Assess and monitor for signs and symptoms of infection  - Monitor lab/diagnostic results  - Monitor all insertion sites, i.e. indwelling lines, tubes, and drains  - Monitor endotracheal if appropriate and nasal secretions for changes in amount and color  - [place] appropriate cooling/warming therapies per order  - Administer medications as ordered  - Instruct and encourage patient and family to use good hand hygiene technique  - Identify and instruct in appropriate isolation precautions for identified infection/condition  Outcome: Adequate for Discharge  Goal: Absence of fever/infection during neutropenic period  Description: INTERVENTIONS:  - Monitor WBC    Outcome: Adequate for Discharge     Problem: SAFETY ADULT  Goal: Maintain or return to baseline ADL function  Description: INTERVENTIONS:  -  Assess patient's ability to carry out ADLs; assess patient's baseline for ADL function and identify physical deficits which impact ability to perform ADLs (bathing, care of mouth/teeth, toileting, grooming, dressing, etc.)  - Assess/evaluate cause of self-care deficits   - Assess range of motion  - Assess patient's mobility; develop plan if impaired  - Assess patient's need for assistive devices and provide as appropriate  - Encourage maximum independence but intervene and supervise when necessary  - Involve family in performance of ADLs  - Assess for home care needs following discharge   - Consider OT consult to assist with ADL evaluation and planning for discharge  - Provide patient education as appropriate  Outcome: Adequate for Discharge  Goal: Maintains/Returns to pre admission functional level  Description: INTERVENTIONS:  - Perform BMAT or MOVE assessment daily.   - Set and communicate daily mobility goal to care team and patient/family/caregiver. - Collaborate with rehabilitation services on mobility goals if consulted  - Perform Range of Motion  times a day. - Reposition patient every  hours.   - Dangle patient  times a day  - Stand patient  times a day  - Ambulate patient  times a day  - Out of bed to chair  times a day   - Out of bed for meals times a day  - Out of bed for toileting  - Record patient progress and toleration of activity level   Outcome: Adequate for Discharge  Goal: Patient will remain free of falls  Description: INTERVENTIONS:  - Educate patient/family on patient safety including physical limitations  - Instruct patient to call for assistance with activity   - Consult OT/PT to assist with strengthening/mobility   - Keep Call bell within reach  - Keep bed low and locked with side rails adjusted as appropriate  - Keep care items and personal belongings within reach  - Initiate and maintain comfort rounds  - Make Fall Risk Sign visible to staff  - Offer Toileting every  Hours, in advance of need  - Initiate/Maintain alarm  - Obtain necessary fall risk management equipment:   - Apply yellow socks and bracelet for high fall risk patients  - Consider moving patient to room near nurses station  Outcome: Adequate for Discharge     Problem: DISCHARGE PLANNING  Goal: Discharge to home or other facility with appropriate resources  Description: INTERVENTIONS:  - Identify barriers to discharge w/patient and caregiver  - Arrange for needed discharge resources and transportation as appropriate  - Identify discharge learning needs (meds, wound care, etc.)  - Arrange for interpretive services to assist at discharge as needed  - Refer to Case Management Department for coordinating discharge planning if the patient needs post-hospital services based on physician/advanced practitioner order or complex needs related to functional status, cognitive ability, or social support system  Outcome: Adequate for Discharge     Problem: Knowledge Deficit  Goal: Patient/family/caregiver demonstrates understanding of disease process, treatment plan, medications, and discharge instructions  Description: Complete learning assessment and assess knowledge base. Interventions:  - Provide teaching at level of understanding  - Provide teaching via preferred learning methods  Outcome: Adequate for Discharge     Problem: OCCUPATIONAL THERAPY ADULT  Goal: Performs self-care activities at highest level of function for planned discharge setting. See evaluation for individualized goals. Description: Treatment Interventions: ADL retraining, Functional transfer training, Endurance training, Cognitive reorientation, Patient/family training, Equipment evaluation/education, Compensatory technique education, Continued evaluation, Energy conservation, Activityengagement  Equipment Recommended: Bedside commode       See flowsheet documentation for full assessment, interventions and recommendations. Outcome: Adequate for Discharge     Problem: PHYSICAL THERAPY ADULT  Goal: Performs mobility at highest level of function for planned discharge setting. See evaluation for individualized goals. Description: Treatment/Interventions: Functional transfer training, LE strengthening/ROM, Therapeutic exercise, Patient/family training, Equipment eval/education, Bed mobility, Gait training, Spoke to nursing, Spoke to case management, OT          See flowsheet documentation for full assessment, interventions and recommendations. Outcome: Adequate for Discharge     Problem: Nutrition/Hydration-ADULT  Goal: Nutrient/Hydration intake appropriate for improving, restoring or maintaining nutritional needs  Description: Monitor and assess patient's nutrition/hydration status for malnutrition.  Collaborate with interdisciplinary team and initiate plan and interventions as ordered. Monitor patient's weight and dietary intake as ordered or per policy. Utilize nutrition screening tool and intervene as necessary. Determine patient's food preferences and provide high-protein, high-caloric foods as appropriate.      INTERVENTIONS:  - Monitor oral intake, urinary output, labs, and treatment plans  - Assess nutrition and hydration status and recommend course of action  - Evaluate amount of meals eaten  - Assist patient with eating if necessary   - Allow adequate time for meals  - Recommend/ encourage appropriate diets, oral nutritional supplements, and vitamin/mineral supplements  - Order, calculate, and assess calorie counts as needed  - Recommend, monitor, and adjust tube feedings and TPN/PPN based on assessed needs  - Assess need for intravenous fluids  - Provide specific nutrition/hydration education as appropriate  - Include patient/family/caregiver in decisions related to nutrition  Outcome: Adequate for Discharge

## 2023-08-10 ENCOUNTER — NURSING HOME VISIT (OUTPATIENT)
Dept: GERIATRICS | Facility: OTHER | Age: 88
End: 2023-08-10
Payer: MEDICARE

## 2023-08-10 VITALS
RESPIRATION RATE: 18 BRPM | DIASTOLIC BLOOD PRESSURE: 59 MMHG | BODY MASS INDEX: 22.87 KG/M2 | TEMPERATURE: 97.2 F | RESPIRATION RATE: 18 BRPM | BODY MASS INDEX: 22.87 KG/M2 | DIASTOLIC BLOOD PRESSURE: 59 MMHG | HEART RATE: 70 BPM | HEART RATE: 70 BPM | OXYGEN SATURATION: 99 % | SYSTOLIC BLOOD PRESSURE: 122 MMHG | TEMPERATURE: 97.2 F | OXYGEN SATURATION: 99 % | WEIGHT: 84.6 LBS | WEIGHT: 84.6 LBS | SYSTOLIC BLOOD PRESSURE: 122 MMHG

## 2023-08-10 DIAGNOSIS — S72.145D CLOSED NONDISPLACED INTERTROCHANTERIC FRACTURE OF LEFT FEMUR WITH ROUTINE HEALING, SUBSEQUENT ENCOUNTER: Primary | ICD-10-CM

## 2023-08-10 PROCEDURE — 99306 1ST NF CARE HIGH MDM 50: CPT | Performed by: INTERNAL MEDICINE

## 2023-08-10 NOTE — ASSESSMENT & PLAN NOTE
History of recent fall resulting in left femoral fracture status post ORIF of left peritrochanteric femur fracture with IM nail insertion on 8/7/2023. Continue weightbearing as tolerated to left lower extremity. Control adequate on Tylenol as needed. Continue Eliquis for DVT prophylaxis, patient has been receiving above for history of recent PE. Celia Eastman Continue PT OT follow-up with orthopedic service.

## 2023-08-10 NOTE — PROGRESS NOTES
[facility]  [address], [address]  [id number]    Nursing Home Admission    NAME: Lana Perry  AGE: 80 y.o. SEX: female [phone]      Patient Location     [address] [facility]    Patient’s care was coordinated with nursing facility staff. Recent vitals, labs and updated medications were reviewed on [employer] system of facility. Past Medical, surgical, social, medication and allergy history and patient’s previous records reviewed. Assessment/Plan:    Fracture of left femur (HCC)  History of recent fall resulting in left femoral fracture status post ORIF of left peritrochanteric femur fracture with IM nail insertion on 8/7/2023. Continue weightbearing as tolerated to left lower extremity. Control adequate on Tylenol as needed. Continue Eliquis for DVT prophylaxis, patient has been receiving above for history of recent PE. Veronica Fitzpatrick Continue PT OT follow-up with orthopedic service. Hypertension:   Blood pressure is slightly on the lower side for age in low 100 range. Patient currently remains on metoprolol 25 mg daily, lisinopril 10 mg daily and amlodipine 5 mg daily. Avoid hypotension. Reduce amlodipine to 2.5 mg daily and follow    Acute blood loss anemia:  Patient was noted to have acute drop in hemoglobin postoperatively. She was transfused 1 unit of PRBC. Repeat hemoglobin was stable. Repeat CBC in 1 week. Start iron supplements  Lab Results   Component Value Date    HGB 7.7 (L) 08/09/2023       Pulmonary embolism:   Patient was diagnosed with acute PE within the last couple of months at [facility]. She has been on Eliquis and 4 L of oxygen since then. Continue Eliquis twice daily and supplemental oxygen as needed    Vitamin D deficiency:  Continue vitamin D supplements    Neuropathy:  Continue gabapentin    CAD:  Stable. No angina symptoms. Patient remains on aspirin, metoprolol and isosorbide mononitrate.     Mild cognitive impairment:  Patient is awake alert able to make her needs known. Continue supportive treatment    Chief Complaint     Fall, left femur fracture    HPI       Patient is a 80 y.o. female With past medical history significant for hypertension, CAD, recent PE on Eliquis therapy and mild cognitive impairment. Patient was recently hospitalized after sustaining a fall at home. Imaging studies revealed left peritrochanteric femur fracture. Patient was seen in consultation by orthopedic service and underwent ORIF of left peritrochanteric femur fracture with IM nail insertion on 8/7/2023. Postop course was complicated by acute blood loss anemia needing 1 unit of PRBC transfusion. Repeat hemoglobin was 7.7. Eliquis was initially held, later resumed postoperatively. Patient was bridged with IV heparin in between. Patient was additionally seen by geriatric service who assisted in patient's medical and medication management. She was subsequently discharged to [facility] for rehab where she is being seen for posthospital admission. At the time of my evaluation patient is doing okay. Remains hard of hearing. Denies any active complaint except for hip pain at times. Patient is awake alert able to make her needs known       Past Medical History:   Diagnosis Date   • CAD (coronary artery disease)    • Hypertension    • Pulmonary embolism ([address])        Past Surgical History:   Procedure Laterality Date   • [place] OPTX FEM SHFT FX W/INSJ IMED IMPLT W/WO SCREW Left 8/7/2023    Procedure: INSERTION NAIL IM FEMUR ANTEGRADE (TROCHANTERIC);   Surgeon: Zakia Miller DO;  Location: [facility];  Service: Orthopedics       Social History     Tobacco Use   Smoking Status Never   Smokeless Tobacco Never        Family history:  NA     No Known Allergies       Current Outpatient Medications:   •  acetaminophen (TYLENOL) 325 mg tablet, Take 2 tablets (650 mg total) by mouth every 4 (four) hours as needed for mild pain, Disp: , Rfl: 0  •  amLODIPine (NORVASC) 10 mg tablet, Take 5 mg by mouth daily, Disp: , Rfl:   •  apixaban (Eliquis) 5 mg, Take 5 mg by mouth 2 (two) times a day, Disp: , Rfl:   •  aspirin 81 mg chewable tablet, Chew 81 mg daily, Disp: , Rfl:   •  cholecalciferol (VITAMIN D3) 1,000 units tablet, Take 2,000 Units by mouth daily, Disp: , Rfl:   •  folic acid (FOLVITE) 1 mg tablet, Take 1 mg by mouth daily, Disp: , Rfl:   •  gabapentin (NEURONTIN) 100 mg capsule, Take 1 capsule (100 mg total) by mouth daily at bedtime, Disp: , Rfl: 0  •  isosorbide mononitrate (IMDUR) 30 mg 24 hr tablet, Take 30 mg by mouth daily, Disp: , Rfl:   •  lisinopril (ZESTRIL) 10 mg tablet, Take 10 mg by mouth 2 (two) times a day, Disp: , Rfl:   •  metoprolol succinate (TOPROL-XL) 25 mg 24 hr tablet, Take 25 mg by mouth daily at bedtime, Disp: , Rfl:   •  polyethylene glycol (MIRALAX) 17 g packet, Take 17 g by mouth daily Do not start before August 10, 2023., Disp: , Rfl: 0  No current facility-administered medications for this visit. Updated list was reviewed in [address] [place] [address] system of facility. Vitals:    08/10/23 0844   BP: 122/59   Pulse: 70   Resp: 18   Temp: (!) 97.2 °F (36.2 °C)   SpO2: 99%       Vital signs were reviewed in point click care    Review of Systems   Constitutional: Negative for chills and fever. HENT: Positive for hearing loss. Negative for nosebleeds and rhinorrhea. Eyes: Negative for discharge and redness. Respiratory: Negative for cough, chest tightness, shortness of breath, wheezing and stridor. Cardiovascular: Negative for chest pain and leg swelling. Gastrointestinal: Negative for abdominal distention, abdominal pain, diarrhea and vomiting. Genitourinary: Negative for dysuria, flank pain and hematuria. Musculoskeletal: Positive for arthralgias (left hip pain) and gait problem. Negative for back pain. Skin: Negative for pallor. Neurological: Positive for weakness (Generalized).  Negative for tremors, seizures, syncope and headaches. Psychiatric/Behavioral: Negative for agitation, behavioral problems and confusion. Physical Exam  Constitutional:       General: She is not in acute distress. HENT:      Head: Normocephalic and atraumatic. Nose: No rhinorrhea. Eyes:      General: No scleral icterus. Right eye: No discharge. Left eye: No discharge. Cardiovascular:      Rate and Rhythm: Normal rate and regular rhythm. Pulmonary:      Breath sounds: No wheezing, rhonchi or rales. Abdominal:      General: There is no distension. Palpations: Abdomen is soft. Tenderness: There is no abdominal tenderness. There is no guarding. Comments: Bulge involving lower abdomen, stat bladder scan revealed PVR of around 147 cc   Musculoskeletal:      Cervical back: Neck supple. Right lower leg: No edema. Left lower leg: No edema. Skin:     Coloration: Skin is not jaundiced. Findings: Bruising (Extensive bruising involving bilateral upper extremities) present. Comments: Left hip incision is covered with dressing   Neurological:      General: No focal deficit present. Mental Status: Mental status is at baseline. Cranial Nerves: No cranial nerve deficit. Motor: Weakness present. Comments: Oriented in month and year   Psychiatric:         Mood and Affect: Mood normal.         Behavior: Behavior normal.         Diagnostic Data       Recent labs and imaging studies were reviewed.   Lab Results   Component Value Date    WBC 9.68 08/09/2023    HGB 7.7 (L) 08/09/2023    HCT 24.6 (L) 08/09/2023    MCV 97 08/09/2023     08/09/2023        Lab Results   Component Value Date    SODIUM 140 08/09/2023    K 3.9 08/09/2023     08/09/2023    CO2 33 (H) 08/09/2023    BUN 32 (H) 08/09/2023    CREATININE 1.06 08/09/2023    GLUC 85 08/09/2023    CALCIUM 8.3 (L) 08/09/2023     Code Status:      DNR           Portions of the record may have been created with voice recognition software. Occasional wrong word or "sound a like" substitutions may have occurred due to the inherent limitations of voice recognition software. Read the chart carefully and recognize, using context, where substitutions have occurred.     This note was electronically signed by Dr. Marge Woo

## 2023-08-14 ENCOUNTER — NURSING HOME VISIT (OUTPATIENT)
Dept: GERIATRICS | Facility: OTHER | Age: 88
End: 2023-08-14
Payer: MEDICARE

## 2023-08-14 VITALS
HEART RATE: 80 BPM | SYSTOLIC BLOOD PRESSURE: 117 MMHG | OXYGEN SATURATION: 98 % | RESPIRATION RATE: 19 BRPM | DIASTOLIC BLOOD PRESSURE: 57 MMHG | TEMPERATURE: 97.9 F

## 2023-08-14 DIAGNOSIS — S72.145D CLOSED NONDISPLACED INTERTROCHANTERIC FRACTURE OF LEFT FEMUR WITH ROUTINE HEALING, SUBSEQUENT ENCOUNTER: Primary | ICD-10-CM

## 2023-08-14 PROCEDURE — 99309 SBSQ NF CARE MODERATE MDM 30: CPT | Performed by: INTERNAL MEDICINE

## 2023-08-14 NOTE — ASSESSMENT & PLAN NOTE
Status post ORIF of left ellie trochanteric femur fracture with IM nail insertion on 8/7/2023. Continue weightbearing as tolerated to left lower extremity. Pt remains on Tylenol for pain and Eliquis for DVT prophylaxis. Of note patient had been on Eliquis in the past due to history of recent PE. Continue PT OT. Follow-up with orthopedic service.

## 2023-08-14 NOTE — PROGRESS NOTES
[address], [address] [facility]    Progress Note                                      Code SNF 31  Patient [facility]    Reason for visit     Follow-up left femur fracture, hypertension, acute blood loss anemia, PE, history of CAD, mild cognitive impairment    Patient’s care was coordinated with nursing facility staff. Recent vitals, labs and updated medications were reviewed on [employer] system of facility. Problem List Items Addressed This Visit        Musculoskeletal and Integument    Fracture of left femur (HCC) - Primary     Status post ORIF of left ellie trochanteric femur fracture with IM nail insertion on 8/7/2023. Continue weightbearing as tolerated to left lower extremity. Pt remains on Tylenol for pain and Eliquis for DVT prophylaxis. Of note patient had been on Eliquis in the past due to history of recent PE. Continue PT OT. Follow-up with orthopedic service. Hypertension:  Patient was  discharged from the hospital on  amlodipine-lisinopril and metoprolol however per  patient’s friend who manages her medication at home pt was previously taken off of all antihypertensives except Imdur. Antihypertensive medications were therefore discontinued. Blood pressure is currently stable at 117/57. Continue to monitor    Acute blood loss anemia:  Patient was noted to have acute drop in hemoglobin postoperatively. She was transfused 1 unit of PRBC. Repeat hemoglobin was stable. Repeat CBC is due in on 8/17. Continue iron supplements for now now    HGB 7.7 (L) 08/09/2023        Pulmonary embolism:   Patient was diagnosed with acute PE within the last couple of months at [facility] continue Eliquis    Vitamin D deficiency:  Continue vitamin D supplements     Neuropathy:  Continue gabapentin     CAD:  Stable. No angina symptoms. Patient currently remains on Imdur and Eliquis. Metoprolol was discontinued as per guardian she had not been receiving above at home     Mild cognitive impairment:  Patient is awake alert able to make her needs known. Continue supportive treatment    HPI         Patient is being seen for a follow-up visit today. She is doing okay at present. Remains hard of hearing. No fever chills or dyspnea. Left hip incision is healing well. Patient is needing assist of 1 for transfers. She is awake alert able to make her needs known. Patient's discharge medications were recently reviewed by her guardian/friend who home managers medications at home. She was taken off of most of her discharge meds except Imdur and Eliquis. Apparently pt was only taking 2 medications at home and was previously taken off of all antihypertensives      Review of Systems   Constitutional: Negative for chills and fever. HENT: Positive for hearing loss. Respiratory: Negative for cough, shortness of breath and wheezing. Gastrointestinal: Negative for abdominal distention, abdominal pain and vomiting. Genitourinary: Negative for flank pain and hematuria. Musculoskeletal: Positive for arthralgias (Hip pain at times) and gait problem. Neurological: Positive for weakness. Negative for seizures and syncope. Psychiatric/Behavioral: Negative for agitation and behavioral problems. Past Medical History:   Diagnosis Date   • CAD (coronary artery disease)    • Hypertension    • Pulmonary embolism (HCC)        Past Surgical History:   Procedure Laterality Date   • MN OPTX FEM SHFT FX W/INSJ IMED IMPLT W/WO SCREW Left 8/7/2023    Procedure: INSERTION NAIL IM FEMUR ANTEGRADE (TROCHANTERIC); Surgeon: Houston Baird DO;  [facility]: [facility];  Service: Orthopedics       Social History     Tobacco Use   Smoking Status Never   Smokeless Tobacco Never       No family history on file.      No Known Allergies      Current Outpatient Medications:   •  acetaminophen (TYLENOL) 325 mg tablet, Take 2 tablets (650 mg total) by mouth every 4 (four) hours as needed for mild pain, Disp: , Rfl: 0  •  apixaban (Eliquis) 5 mg, Take 5 mg by mouth 2 (two) times a day, Disp: , Rfl:   •  isosorbide mononitrate (IMDUR) 30 mg 24 hr tablet, Take 30 mg by mouth daily, Disp: , Rfl:     Updated list was reviewed in [facility] of facility. Vitals:    08/14/23 0952   BP: 117/57   Pulse: 80   Resp: 19   Temp: 97.9 °F (36.6 °C)   SpO2: 98%       Physical Exam  HENT:      Head: Normocephalic and atraumatic. Eyes:      General: No scleral icterus. Cardiovascular:      Rate and Rhythm: Normal rate and regular rhythm. Heart sounds: Murmur heard. Pulmonary:      Breath sounds: No wheezing, rhonchi or rales. Comments: Patient is noted to be on oxygen  Abdominal:      General: There is no distension. Palpations: Abdomen is soft. Tenderness: There is no abdominal tenderness. There is no guarding. Musculoskeletal:      Cervical back: Neck supple. Right lower leg: Edema present. Left lower leg: Edema present. Comments: Mild edema involving bilateral lower extremities more pronounced in right lower extremity  Supraclavicular muscle wasting noted   Skin:     Coloration: Skin is not jaundiced. Comments: Hip incision is covered with dressing. Neurological:      General: No focal deficit present. Cranial Nerves: No cranial nerve deficit. Psychiatric:         Mood and Affect: Mood normal.         Behavior: Behavior normal.         Diagnostic Data:    Lab Results   Component Value Date    WBC 9.68 08/09/2023    HGB 7.7 (L) 08/09/2023    HCT 24.6 (L) 08/09/2023    MCV 97 08/09/2023     08/09/2023     Lab Results   Component Value Date    SODIUM 140 08/09/2023    K 3.9 08/09/2023     08/09/2023    CO2 33 (H) 08/09/2023    BUN 32 (H) 08/09/2023    CREATININE 1.06 08/09/2023    GLUC 85 08/09/2023    CALCIUM 8.3 (L) 08/09/2023     Repeat CBC BMP is due on 8/17. Will follow  Care coordinated with patient's caretaker. Meds reviewed    Portions of the record may have been created with voice recognition software. Occasional wrong word or "sound a like" substitutions may have occurred due to the inherent limitations of voice recognition software. Read the chart carefully and recognize, using context, where substitutions have occurred.     This note was electronically signed by Dr. Jagdish Stuart

## 2023-08-21 ENCOUNTER — APPOINTMENT (OUTPATIENT)
Dept: RADIOLOGY | Facility: AMBULARY SURGERY CENTER | Age: 88
End: 2023-08-21
Attending: STUDENT IN AN ORGANIZED HEALTH CARE EDUCATION/TRAINING PROGRAM
Payer: MEDICARE

## 2023-08-21 ENCOUNTER — OFFICE VISIT (OUTPATIENT)
Dept: OBGYN CLINIC | Facility: CLINIC | Age: 88
End: 2023-08-21

## 2023-08-21 VITALS — HEIGHT: 55 IN | BODY MASS INDEX: 19.58 KG/M2 | WEIGHT: 84.6 LBS

## 2023-08-21 DIAGNOSIS — S72.145D CLOSED NONDISPLACED INTERTROCHANTERIC FRACTURE OF LEFT FEMUR WITH ROUTINE HEALING, SUBSEQUENT ENCOUNTER: Primary | ICD-10-CM

## 2023-08-21 DIAGNOSIS — S72.145A CLOSED NONDISPLACED INTERTROCHANTERIC FRACTURE OF LEFT FEMUR, INITIAL ENCOUNTER (HCC): ICD-10-CM

## 2023-08-21 DIAGNOSIS — S72.145A CLOSED NONDISPLACED INTERTROCHANTERIC FRACTURE OF LEFT FEMUR, INITIAL ENCOUNTER (HCC): Primary | ICD-10-CM

## 2023-08-21 PROCEDURE — 99024 POSTOP FOLLOW-UP VISIT: CPT | Performed by: STUDENT IN AN ORGANIZED HEALTH CARE EDUCATION/TRAINING PROGRAM

## 2023-08-21 PROCEDURE — 73502 X-RAY EXAM HIP UNI 2-3 VIEWS: CPT

## 2023-08-21 RX ORDER — OXYCODONE HYDROCHLORIDE 5 MG/1
2.5 TABLET ORAL EVERY 6 HOURS PRN
Qty: 15 TABLET | Refills: 0 | Status: SHIPPED | OUTPATIENT
Start: 2023-08-21 | End: 2023-09-20

## 2023-08-21 NOTE — PROGRESS NOTES
Orthopaedic Surgery - Office Note  Neville Essex (89 y.o. female)   : 1920   MRN: [id number]  Encounter Date: 2023    Chief Complaint   Patient presents with   • Left Hip - Post-op     Past Surgical History:   Procedure Laterality Date   • [place] OPTX FEM SHFT FX W/INSJ IMED IMPLT W/WO SCREW Left 2023    Procedure: INSERTION NAIL IM FEMUR ANTEGRADE (TROCHANTERIC); Surgeon: Gurvinder Velazquez DO;  [facility];  Service: Orthopedics   • TONSILLECTOMY       Assessment / Plan  1. Left peritrochanteric femur fracture s/p Left femur IM nail, DOS 23     · X-ray left hip was reviewed in the office today. Demonstrate maintained alignment of the patient's left peritrochanteric femur fracture with controlled shortening of the proximal femoral segment and stable hardware. · Staples were removed in the office today. Steri-Strips applied. Incisions intact  · Continue weightbearing as tolerated with use of a walker. Range of motion as tolerated  · Continue with physical therapy for gait training and stability  · Continue Eliquis for DVT prophylaxis  · Patient will need to follow-up in 6 weeks for repeat x-ray evaluation of the left hip and pelvis    History of Present Illness  Neville Essex is a 80 y.o. female who presents today 2 weeks s/p left femur IM nail , DOS 23 for nondisplaced intertrochanteric fracture. She is present in a wheelchair today. She currently resides at [facility]. She has been weightbearing as tolerated with use of walker. She notes pain over lateral left hip which is minimal and does not require narcotic pain medications. Able to ambulate at her facility significant distances without discomfort. No numbness or paresthesias. Patient lives at home alone with her dog but does have a caretaker . Review of Systems  Pertinent items are noted in HPI. All other systems were reviewed and are negative.     Physical Exam  There were no vitals taken for this visit. Cons: Appears well. No apparent distress. Psych: Alert. Oriented x3. Mood and affect normal.  Eyes: PERRLA, EOMI  Resp: Normal effort. No audible wheezing or stridor. CV: Palpable pulse. No discernable arrhythmia. Lymph:  No palpable cervical, axillary, or inguinal lymphadenopathy. Skin: Warm. No palpable masses. No visible lesions. Neuro: Normal muscle tone. Normal and symmetric DTR's. Left lower extremity:   Left lower extremity was exposed inspected. Patient previous surgical incisions are intact without any surrounding erythema or induration. The patient's staples were removed and Steri-Strips applied. No dehiscence noted. No significant swelling around the left hip. Patient has bilateral dependent edema. No calf tenderness. The patient's sensation is intact to light touch in superficial peroneal, deep peroneal, sural, saphenous, plantar nerve distributions. Tibialis anterior, extensor hallux longus, gastrocnemius muscles are intact. Limb is well-perfused. Patient has no pain with gentle range of motion of the left hip. Erika-incisional tenderness      Studies Reviewed  X-ray left hip demonstrates previously implanted short cephalomedullary device for a left peritrochanteric femur fracture with control sliding of the cephalomedullary screw and no signs of hardware failure or loosening    Procedures  No preformed today     Medical, Surgical, Family, and Social History  The patient's medical history, family history, and social history, were reviewed and updated as appropriate. Past Medical History:   Diagnosis Date   • CAD (coronary artery disease)    • Hypertension    • Pulmonary embolism ([address])            No family history on file. Social History     Occupational History   • Not on file   Tobacco Use   • Smoking status: Never   • Smokeless tobacco: Never   Substance and Sexual Activity   • Alcohol use:  Yes     Alcohol/week: 2.0 standard drinks of alcohol     Types: 2 Glasses of wine per week     Comment: social   • Drug use: Never   • Sexual activity: Not Currently       No Known Allergies      Current Outpatient Medications:   •  acetaminophen (TYLENOL) 325 mg tablet, Take 2 tablets (650 mg total) by mouth every 4 (four) hours as needed for mild pain, Disp: , Rfl: 0  •  apixaban (Eliquis) 5 mg, Take 5 mg by mouth 2 (two) times a day, Disp: , Rfl:   •  isosorbide mononitrate (IMDUR) 30 mg 24 hr tablet, Take 30 mg by mouth daily, Disp: , Rfl:         Scribe Attestation    I,:  Faina Gaspar am acting as a scribe while in the presence of the attending physician.:       I,:  Rafy Floyd DO personally performed the services described in this documentation    as scribed in my presence.:

## 2023-08-21 NOTE — PATIENT INSTRUCTIONS
General Medicine H&P     Patient presents with:  Pain (neurologic)       PCP: Praveena Souza DO    History of Present Illness: Patient is a 55year old male with PMH sig for GERD, HTN, esophageal ca, who p/t [facility] ED c RLE pain.      Pt reports sxs 2d ago afte Orthopaedic Surgery - Office Note  Eugenio Yao (89 y.o. female)   : 1920   MRN: [id number]  Encounter Date: 2023         Chief Complaint   Patient presents with    Left Hip - Post-op      Surgical History         Past Surgical History:   Procedure Laterality Date    SD OPTX FEM SHFT FX W/INSJ IMED IMPLT W/WO SCREW Left 2023     Procedure: INSERTION NAIL IM FEMUR ANTEGRADE (TROCHANTERIC); Surgeon: Jolynn Mccray DO;  [facility];  Service: Orthopedics    TONSILLECTOMY             Assessment / Plan  1. Left peritrochanteric femur fracture s/p Left femur IM nail, DOS 23      X-ray left hip was reviewed in the office today. Demonstrate maintained alignment of the patient's left peritrochanteric femur fracture with controlled shortening of the proximal femoral segment and stable hardware. Staples were removed in the office today. Steri-Strips applied. Incisions intact  Continue weightbearing as tolerated with use of a walker. Range of motion as tolerated  Continue with physical therapy for gait training and stability  Continue Eliquis for DVT prophylaxis  Patient will need to follow-up in 6 weeks for repeat x-ray evaluation of the left hip and pelvis     History of Present Illness  Eugenio Yao is a [place] y.o. female who presents today 2 weeks s/p left femur IM nail , DOS 23 for nondisplaced intertrochanteric fracture. She is present in a wheelchair today. She currently resides at [facility]. She has been weightbearing as tolerated with use of walker. She notes pain over lateral left hip which is minimal and does not require narcotic pain medications. Able to ambulate at her facility significant distances without discomfort. No numbness or paresthesias. Patient lives at home alone with her dog but does have a caretaker . Review of Systems  Pertinent items are noted in HPI. All other systems were reviewed and are negative.      Physical Exam  There were no vitals taken for this visit. Cons:   Appears well. No apparent distress. Psych: Alert. Oriented x3. Mood and affect normal.  Eyes:   PERRLA, EOMI  Resp:   Normal effort. No audible wheezing or stridor. CV:      Palpable pulse. No discernable arrhythmia. Lymph:  No palpable cervical, axillary, or inguinal lymphadenopathy. Skin:    Warm. No palpable masses. No visible lesions. Neuro: Normal muscle tone. Normal and symmetric DTR's. Left lower extremity:   Left lower extremity was exposed inspected. Patient previous surgical incisions are intact without any surrounding erythema or induration. The patient's staples were removed and Steri-Strips applied. No dehiscence noted. No significant swelling around the left hip. Patient has bilateral dependent edema. No calf tenderness. The patient's sensation is intact to light touch in superficial peroneal, deep peroneal, sural, saphenous, plantar nerve distributions. Tibialis anterior, extensor hallux longus, gastrocnemius muscles are intact. Limb is well-perfused. Patient has no pain with gentle range of motion of the left hip. Erika-incisional tenderness        Studies Reviewed  X-ray left hip demonstrates previously implanted short cephalomedullary device for a left peritrochanteric femur fracture with control sliding of the cephalomedullary screw and no signs of hardware failure or loosening     Procedures  No preformed today      Medical, Surgical, Family, and Social History  The patient's medical history, family history, and social history, were reviewed and updated as appropriate. Medical History        Past Medical History:   Diagnosis Date    CAD (coronary artery disease)      Hypertension      Pulmonary embolism ([address])                    Family History   No family history on file.         Social History            Occupational History    Not on file   Tobacco Use    Smoking status: Never    Smokeless tobacco: Never   Substance and Sexual wheezes  Abd: s/nt/nd, +bs  LE: no c/e/e  Neuro: CN 2-12 intact, no focal deficits, stregth intact      LABS:        Radiology: Mri Spine Lumbar (cpt=72148)    Result Date: 12/20/2017  PROCEDURE:  MRI SPINE LUMBAR (CPT=72148)  COMPARISON:  None.   INDICATIO Activity    Alcohol use:  Yes       Alcohol/week: 2.0 standard drinks of alcohol       Types: 2 Glasses of wine per week       Comment: social    Drug use: Never    Sexual activity: Not Currently         No Known Allergies        Current Outpatient Medications:     acetaminophen (TYLENOL) 325 mg tablet, Take 2 tablets (650 mg total) by mouth every 4 (four) hours as needed for mild pain, Disp: , Rfl: 0    apixaban (Eliquis) 5 mg, Take 5 mg by mouth 2 (two) times a day, Disp: , Rfl:     isosorbide mononitrate (IMDUR) 30 mg 24 hr tablet, Take 30 mg by mouth daily, Disp: , Rfl: was performed from the dome of the diaphragm to the pubic symphysis with non-ionic intravenous contrast material. Post contrast coronal MPR imaging was performed. Dose reduction techniques were used.  Dose information is transmitted to the [facility] ([facility] ASSESSMENT / PLAN:    55year old male with PMH sig for GERD, HTN, esophageal ca, who p/t [facility] ED c RLE pain.      RLE radicular pain  -reviewed MRI c pt and Dr. Chandu Vo, L4/5 foraminal disc herniation  -currently on IV pain meds and IV steriods  -wi

## 2023-08-22 ENCOUNTER — NURSING HOME VISIT (OUTPATIENT)
Dept: GERIATRICS | Facility: OTHER | Age: 88
End: 2023-08-22
Payer: MEDICARE

## 2023-08-22 DIAGNOSIS — D62 ACUTE BLOOD LOSS ANEMIA: ICD-10-CM

## 2023-08-22 DIAGNOSIS — G89.11 ACUTE PAIN DUE TO TRAUMA: ICD-10-CM

## 2023-08-22 DIAGNOSIS — S72.145D CLOSED NONDISPLACED INTERTROCHANTERIC FRACTURE OF LEFT FEMUR WITH ROUTINE HEALING, SUBSEQUENT ENCOUNTER: Primary | ICD-10-CM

## 2023-08-22 DIAGNOSIS — R53.81 DEBILITY: ICD-10-CM

## 2023-08-22 DIAGNOSIS — J96.01 ACUTE RESPIRATORY FAILURE WITH HYPOXIA (HCC): ICD-10-CM

## 2023-08-22 DIAGNOSIS — I26.99 PULMONARY EMBOLISM, UNSPECIFIED CHRONICITY, UNSPECIFIED PULMONARY EMBOLISM TYPE, UNSPECIFIED WHETHER ACUTE COR PULMONALE PRESENT (HCC): ICD-10-CM

## 2023-08-22 PROCEDURE — 99309 SBSQ NF CARE MODERATE MDM 30: CPT | Performed by: NURSE PRACTITIONER

## 2023-08-22 RX ORDER — ACETAMINOPHEN 325 MG/1
975 TABLET ORAL EVERY 8 HOURS
COMMUNITY

## 2023-08-22 RX ORDER — FERROUS SULFATE 325(65) MG
325 TABLET ORAL
COMMUNITY

## 2023-08-22 NOTE — ASSESSMENT & PLAN NOTE
Multifactorial  Continue supportive care at SNF for ADLs  Continue PT/OT  Continue safety/fall precautions  Ensure adequate hydration and nutrition

## 2023-08-22 NOTE — ASSESSMENT & PLAN NOTE
Likely multifactorial in setting of recent PE and recent trauma  Continue O2 supplementation via nasal cannula  Wean off oxygen when tolerated

## 2023-08-22 NOTE — ASSESSMENT & PLAN NOTE
History of PE  Currently on apixaban 5 mg twice daily  No signs of active bleeding noted  Patient will need to follow-up with PCP once discharged from rehab facility for management

## 2023-08-22 NOTE — ASSESSMENT & PLAN NOTE
S/p IM nail 8/7/2023 with orthopedics  Had recent follow-up with orthopedics on 8/21/2023  Left hip x-ray results as per orthopedics: Demonstrates maintained alignment of the patient's left peritrochanteric femur fracture with control shortening of the proximal femoral segment and stable hardware  Staples were removed in office and Steri-Strips applied at orthopedics office  Continue weightbearing as tolerated left lower extremity  Patient is on apixaban for history of PE which which also serves as DVT prophylaxis  Nursing reports patient has increase in pain left hip; will add gabapentin 100 mg nightly  Continue scheduled Tylenol 975 mg 3 times daily  Follow-up with orthopedics as scheduled outpatient on 10/2/2023

## 2023-08-22 NOTE — ASSESSMENT & PLAN NOTE
Acute pain secondary to traumatic injuries status post surgical repair  Patient is currently on scheduled Tylenol, PRN oxycodone, and gabapentin 100 mg nightly added  Monitor for constipation; consider adding MiraLAX daily to prevent constipation due to immobility and pain medication

## 2023-08-22 NOTE — PROGRESS NOTES
Facility: [facility]  POS: 32 (STR)  Progress Note  Sachin Dunn   BD: 4/6/1920  Code Status:  DNR    Assessment/Plan:  8-year-old female with:    Fracture of left femur ([address])  S/p IM nail 8/7/2023 with orthopedics  Had recent follow-up with orthopedics on 8/21/2023  Left hip x-ray results as per orthopedics: Demonstrates maintained alignment of the patient's left peritrochanteric femur fracture with control shortening of the proximal femoral segment and stable hardware  Staples were removed in office and Steri-Strips applied at orthopedics office  Continue weightbearing as tolerated left lower extremity  Patient is on apixaban for history of PE which which also serves as DVT prophylaxis  Nursing reports patient has increase in pain left hip; will add gabapentin 100 mg nightly  Continue scheduled Tylenol 975 mg 3 times daily  Follow-up with orthopedics as scheduled outpatient on 10/2/2023    Acute pain due to trauma  Acute pain secondary to traumatic injuries status post surgical repair  Patient is currently on scheduled Tylenol, PRN oxycodone, and gabapentin 100 mg nightly added  Monitor for constipation; consider adding MiraLAX daily to prevent constipation due to immobility and pain medication    Acute blood loss anemia  In setting of recent trauma  Most recent hemoglobin 8.5/hematocrit 26.3    Pulmonary embolism (HCC)  History of PE  Currently on apixaban 5 mg twice daily  No signs of active bleeding noted  Patient will need to follow-up with PCP once discharged from rehab facility for management    Acute respiratory failure with hypoxia ([address])  Likely multifactorial in setting of recent PE and recent trauma  Continue O2 supplementation via nasal cannula  Wean off oxygen when tolerated    Debility  Multifactorial  Continue supportive care at SNF for ADLs  Continue PT/OT  Continue safety/fall precautions  Ensure adequate hydration and nutrition    Chief Complaint/Reason for visit: STR follow up visit  History of Present Illness: 8-year-old female seen and examined for STR follow-up of acute and chronic medical conditions. Received patient seated in recliner chair and appeared in no distress. Nursing staff reports that patient has an increase in pain to left hip area. No complaints of pain at time of exam.  Neurovascular status intact to left lower extremity. Oxygen on via nasal cannula O2 sat was 99%; will wean off O2 as tolerated. See A/P for additional information. Past Medical History: unchanged from history and physical  Past Medical History:   Diagnosis Date   • CAD (coronary artery disease)    • Hypertension    • Pulmonary embolism (HCC)      Family History: unchanged from history and physical  Social History: unchanged from history and physical  Review of systems: As per review of medical illness, all other systems reviewed and negative. Medications: All medication and routine orders were reviewed and updated  Allergies: NKDA  Consults reviewed:PT, OT and Other  Labs/Diagnostics (reviewed by this provider): Copy in Chart EMR    Imaging Reviewed: None today    Physical Exam    Weight: 84.6 pounds  Temp: 97.2          BP: 145/72  pulse: 66  resp: 18   O2 Sat: 99% on O2 at 3 L via nasal cannula  Orientation:Person and Place     Physical Exam  Vitals and nursing note reviewed. Constitutional:       General: She is not in acute distress. Appearance: She is ill-appearing. She is not toxic-appearing or diaphoretic. Comments: Frail cachectic elderly female who appears with chronic illness. In no distress. HENT:      Head: Normocephalic. Nose: No congestion or rhinorrhea. Mouth/Throat:      Mouth: Mucous membranes are moist.      Pharynx: No oropharyngeal exudate. Eyes:      General: No scleral icterus. Right eye: No discharge. Left eye: No discharge. Extraocular Movements: Extraocular movements intact.       Conjunctiva/sclera: Conjunctivae normal. Pupils: Pupils are equal, round, and reactive to light. Comments: Wears prescription glasses. Cardiovascular:      Rate and Rhythm: Normal rate. Pulses: Normal pulses. Pulmonary:      Effort: Pulmonary effort is normal. No respiratory distress. Comments: Short of breath on exertion  Abdominal:      General: Bowel sounds are normal. There is no distension. Palpations: Abdomen is soft. Tenderness: There is no abdominal tenderness. There is no guarding. Musculoskeletal:      Right lower leg: Edema present. Left lower leg: Edema (LLE edema >RLE) present. Comments: Moves all 4 extremities. Skin:     General: Skin is warm and dry. Capillary Refill: Capillary refill takes less than 2 seconds. Neurological:      Mental Status: She is alert. Mental status is at baseline. Motor: Weakness present. Gait: Gait abnormal.   Psychiatric:         Mood and Affect: Mood normal.         Behavior: Behavior normal.         Thought Content: Thought content normal.       This note was completed in part utilizing [employer] direct voice recognition software. Grammatical errors, random word insertion, spelling mistakes, and incomplete sentences may be an occasional consequence of the system secondary to software limitations, ambient noise and hardware issues. At the time of dictation, efforts were made to edit, clarify and/or correct errors. Please read the chart carefully and recognize, using context, where substitutions have occurred. If you have any questions or concerns about the context, text or information contained within the body of this dictation, please contact myself, the provider, for further clarification.     Cr Ervin  4/46/[id number]:53 PM

## 2023-08-23 ENCOUNTER — NURSING HOME VISIT (OUTPATIENT)
Dept: GERIATRICS | Facility: OTHER | Age: 88
End: 2023-08-23

## 2023-08-23 DIAGNOSIS — J96.01 ACUTE RESPIRATORY FAILURE WITH HYPOXIA (HCC): ICD-10-CM

## 2023-08-23 DIAGNOSIS — I10 HYPERTENSION, UNSPECIFIED TYPE: ICD-10-CM

## 2023-08-23 DIAGNOSIS — F03.A0 MILD DEMENTIA WITHOUT BEHAVIORAL DISTURBANCE, PSYCHOTIC DISTURBANCE, MOOD DISTURBANCE, OR ANXIETY, UNSPECIFIED DEMENTIA TYPE (HCC): ICD-10-CM

## 2023-08-23 DIAGNOSIS — I26.99 PULMONARY EMBOLISM, UNSPECIFIED CHRONICITY, UNSPECIFIED PULMONARY EMBOLISM TYPE, UNSPECIFIED WHETHER ACUTE COR PULMONALE PRESENT (HCC): ICD-10-CM

## 2023-08-23 DIAGNOSIS — G89.11 ACUTE PAIN DUE TO TRAUMA: ICD-10-CM

## 2023-08-23 DIAGNOSIS — D62 ACUTE BLOOD LOSS ANEMIA: ICD-10-CM

## 2023-08-23 DIAGNOSIS — S72.145D CLOSED NONDISPLACED INTERTROCHANTERIC FRACTURE OF LEFT FEMUR WITH ROUTINE HEALING, SUBSEQUENT ENCOUNTER: Primary | ICD-10-CM

## 2023-08-23 DIAGNOSIS — E44.0 MODERATE PROTEIN-CALORIE MALNUTRITION (HCC): ICD-10-CM

## 2023-08-23 DIAGNOSIS — R53.81 DEBILITY: ICD-10-CM

## 2023-08-23 RX ORDER — GABAPENTIN 100 MG/1
100 CAPSULE ORAL
COMMUNITY

## 2023-08-23 NOTE — ASSESSMENT & PLAN NOTE
S/p IMN left hip fracture  8/7/2023  Had recent follow-up with orthopedics as scheduled on 8/21/2023.   Staples were removed in office  Incision line intact without signs of infection  Neurovascular status intact to left lower extremity  Continue WBAT LLE   Patient is on apixaban for history of PE and DVT  Follow-up with orthopedics as scheduled outpatient on 10/2/2023

## 2023-08-23 NOTE — ASSESSMENT & PLAN NOTE
Likely multifactorial in setting of recent PE and recent trauma  With history of recent DVT  Continue oxygen supplementation via nasal cannula  Wean off oxygen when tolerated

## 2023-08-23 NOTE — ASSESSMENT & PLAN NOTE
Multifactorial  Patient participated in therapy during her SNF stay  Recommend that patient continue with PT/OT with home health services  Continue safety/fall precautions

## 2023-08-23 NOTE — ASSESSMENT & PLAN NOTE
In setting of recent trauma  Most recent hemoglobin 8.5/hematocrit 26.3  Continue ferrous sulfate  Follow-up with PCP for management

## 2023-08-23 NOTE — ASSESSMENT & PLAN NOTE
As evidenced by low BMI, loss of subcutaneous and muscle tissue  Likely multifactorial in setting of acute and chronic medical conditions  Currently on a regular diet   And nutritional supplements at home if appetite is poor  Follow-up with PCP

## 2023-08-23 NOTE — ASSESSMENT & PLAN NOTE
Acute left hip pain secondary to traumatic injury status postsurgical repair  Continue gabapentin 100 mg nightly; discontinue when no longer in pain  Continue scheduled Tylenol; switch to as needed when no longer having severe pain  Continue oxycodone PRN and discontinue when able  Monitor for constipation and consider adding MiraLAX daily to prevent constipation due to immobility and pain medications

## 2023-08-23 NOTE — PROGRESS NOTES
[address]  [phone]  DISCHARGE SUMMARY  POS: [address]    NAME: Brianda Hoffmann  AGE: 80 y.o. SEX: female  DATE OF ADMISSION: 8/9/2023 DATE OF DISCHARGE: 8/24/2023 DISCHARGE DISPOSITION: Home  Code status: DNR  Reason for admission: Patient was admitted from [address] [place] [address] for rehabilitation after hospitalization for status post mechanical fall resulting in left peritrochanteric femur fracture status post IM nail insertion 8/7/2023; acute pain due to trauma; acute blood loss anemia; ambulatory dysfunction; history of PE; recent DVT    Admission Diagnoses: As above mentioned  Additional Problems:   Past Medical History:   Diagnosis Date   • CAD (coronary artery disease)    • Hypertension    • Pulmonary embolism ([address])      Discharge Diagnoses: See problem list follow up recommendations below. Course of stay: Patient was admitted to [facility] for rehabilitation following hospitalization for above-mentioned. Received patient seated in chair with caregiver at bedside. Patient appears in no distress and denies having pain at time of exam.  Reviewed discharge medications with patient's great niece Chela Musa via phone and she verbalized understanding prior to hanging up. No signs or symptoms of infection. Patient had follow-up with orthopedics office on 8/21/2023; notes reviewed. During the resident's stay at [facility], she received skilled nursing care, PT, OT, dietitian support, social service support, and medical management for an overall improvement in her functional status. She is scheduled to be discharged home tomorrow with 24/7 care. Recommend that patient continue with PT/OT with home health services.     Pertinent Labs and testing performed and reviewed by this provider during stay: CBC with differential and BMP  Creatinine: 0.92     BUN: 33      Hemoglobin: 8.5 hematocrit: 26.3  New Medications: Oxycodone, gabapentin  Discharge Medications: See discharge medication list which was reviewed in Psychiatric and compared to facility orders for accuracy. Status at time of discharge exam: Stable    Today's Visit: 8/23/20232:01 PM    Subjective: No complaints    Review of systems: As per review of medical illness, all other systems reviewed and negative. Vitals: BP: 145/72    Temp: 97.2    HR: 68      Resp: 16    O2 sat: 98% on oxygen via nasal cannula    Exam: Physical Exam  Vitals and nursing note reviewed. Constitutional:       General: She is not in acute distress. Appearance: She is not toxic-appearing or diaphoretic. Comments: Frail and cachectic appearing elderly female who appears with chronic illness. In no distress. HENT:      Head: Normocephalic. Ears:      Comments: Hard of hearing. Wears bilateral hearing aids. Nose: No congestion or rhinorrhea. Mouth/Throat:      Mouth: Mucous membranes are moist.      Pharynx: No oropharyngeal exudate. Eyes:      General:         Right eye: No discharge. Left eye: No discharge. Extraocular Movements: Extraocular movements intact. Conjunctiva/sclera: Conjunctivae normal.      Pupils: Pupils are equal, round, and reactive to light. Cardiovascular:      Rate and Rhythm: Normal rate. Pulses: Normal pulses. Pulmonary:      Effort: Pulmonary effort is normal. No respiratory distress. Breath sounds: No rhonchi. Comments: Decreased breath sounds bibasilar. Abdominal:      General: Bowel sounds are normal. There is no distension. Palpations: Abdomen is soft. Tenderness: There is no abdominal tenderness. There is no guarding. Musculoskeletal:         General: Deformity (Kyphoscoliosis) present. Cervical back: Neck supple. No rigidity. Right lower leg: No edema. Left lower leg: No edema. Comments: Moves all 4 extremities.    Lymphadenopathy: Cervical: No cervical adenopathy. Skin:     General: Skin is warm and dry. Capillary Refill: Capillary refill takes less than 2 seconds. Neurological:      Mental Status: She is alert. Mental status is at baseline. Motor: Weakness present. Gait: Gait abnormal.   Psychiatric:         Mood and Affect: Mood normal.         Behavior: Behavior normal.         Thought Content: Thought content normal.       Discussion with patient/family and further instructions:  -Fall precautions   -Bleeding precautions  -Monitor for signs/symptoms of infection  -Medication list was reviewed     Follow-up Recommendations: Please follow-up with your primary care physician within 7-10 days of discharge to review medication changes and current status. Problem List Follow-up Recommendations:  8-year-old female with:    Fracture of left femur ([address])  S/p IMN left hip fracture  8/7/2023  Had recent follow-up with orthopedics as scheduled on 8/21/2023.   Staples were removed in office  Incision line intact without signs of infection  Neurovascular status intact to left lower extremity  Continue WBAT LLE   Patient is on apixaban for history of PE and DVT  Follow-up with orthopedics as scheduled outpatient on 10/2/2023    Acute pain due to trauma  Acute left hip pain secondary to traumatic injury status postsurgical repair  Continue gabapentin 100 mg nightly; discontinue when no longer in pain  Continue scheduled Tylenol; switch to as needed when no longer having severe pain  Continue oxycodone PRN and discontinue when able  Monitor for constipation and consider adding MiraLAX daily to prevent constipation due to immobility and pain medications    Acute blood loss anemia  In setting of recent trauma  Most recent hemoglobin 8.5/hematocrit 26.3  Continue ferrous sulfate  Follow-up with PCP for management    Acute respiratory failure with hypoxia ([address])  Likely multifactorial in setting of recent PE and recent trauma, history of restrictive thoracic disease and kyphoscoliosis  Continue oxygen supplementation via nasal cannula  Wean off oxygen when tolerated    Hypertension  Follows with [facility] cardiology outpatient  /72 today  Continue isosorbide mononitrate 30 mg daily    Pulmonary embolism (HCC)  With history of PE and DVT  Currently on apixaban 5 mg twice daily  No signs of active bleeding noted  Follow-up with PCP for management    Mild dementia ([address])  Followed by [facility] geriatrics last seen 2/10/2023 for comprehensive geriatric assessment  Slums score 20 out of 30  Recommend to continue cognitive activities  Avoid deliriogenic medications    Moderate protein-calorie malnutrition ([address])  As evidenced by low BMI, loss of subcutaneous and muscle tissue  Likely multifactorial in setting of acute and chronic medical conditions  Currently on a regular diet   And nutritional supplements at home if appetite is poor  Follow-up with PCP    Debility  Multifactorial  Patient participated in therapy during her SNF stay  Recommend that patient continue with PT/OT with home health services  Continue safety/fall precautions    I have spent >30 minutes with patient today in which greater than 50% of this time was spent in counseling/coordination of care regarding Diagnostic results, Instructions for management, Importance of tx compliance, Counseling / Coordination of care, Documenting in the medical record, Reviewing / ordering tests, medicine, procedures  , Obtaining or reviewing history   and Communicating with other healthcare professionals . PCP made aware of discharge summary via epic communications. This note was completed in part utilizing [employer] voice recognition software. Grammatical errors, random word insertion, spelling mistakes, and incomplete sentences may be an occasional consequence of the system secondary to software limitations, ambient noise and hardware issues.   At the time of dictation, efforts were made to edit, clarify and/or correct errors. Please read the chart carefully and recognize, using context, where substitutions have occurred. If you have any questions or concerns about the context, text or information contained within the body of this dictation, please contact myself, the provider, for further clarification.     Triny Watkins  9/66/67638:85 PM

## 2023-08-23 NOTE — ASSESSMENT & PLAN NOTE
With history of PE and DVT  Currently on apixaban 5 mg twice daily  No signs of active bleeding noted  Follow-up with PCP for management

## 2023-08-23 NOTE — ASSESSMENT & PLAN NOTE
Followed by [facility] geriatrics last seen 2/10/2023 for comprehensive geriatric assessment  Slums score 20 out of 30  Recommend to continue cognitive activities  Avoid deliriogenic medications
